# Patient Record
Sex: FEMALE | Race: WHITE | NOT HISPANIC OR LATINO | Employment: OTHER | URBAN - METROPOLITAN AREA
[De-identification: names, ages, dates, MRNs, and addresses within clinical notes are randomized per-mention and may not be internally consistent; named-entity substitution may affect disease eponyms.]

---

## 2017-02-27 ENCOUNTER — APPOINTMENT (OUTPATIENT)
Dept: PHYSICAL THERAPY | Facility: CLINIC | Age: 67
End: 2017-02-27
Payer: MEDICARE

## 2017-02-27 PROCEDURE — 97035 APP MDLTY 1+ULTRASOUND EA 15: CPT

## 2017-02-27 PROCEDURE — 97014 ELECTRIC STIMULATION THERAPY: CPT

## 2017-02-27 PROCEDURE — G8981 BODY POS CURRENT STATUS: HCPCS

## 2017-02-27 PROCEDURE — 97110 THERAPEUTIC EXERCISES: CPT

## 2017-02-27 PROCEDURE — 97140 MANUAL THERAPY 1/> REGIONS: CPT

## 2017-02-27 PROCEDURE — 97163 PT EVAL HIGH COMPLEX 45 MIN: CPT

## 2017-02-27 PROCEDURE — G8982 BODY POS GOAL STATUS: HCPCS

## 2017-03-02 ENCOUNTER — APPOINTMENT (OUTPATIENT)
Dept: PHYSICAL THERAPY | Facility: CLINIC | Age: 67
End: 2017-03-02
Payer: MEDICARE

## 2017-03-02 PROCEDURE — 97110 THERAPEUTIC EXERCISES: CPT

## 2017-03-02 PROCEDURE — 97140 MANUAL THERAPY 1/> REGIONS: CPT

## 2017-03-02 PROCEDURE — 97035 APP MDLTY 1+ULTRASOUND EA 15: CPT

## 2017-03-02 PROCEDURE — 97014 ELECTRIC STIMULATION THERAPY: CPT

## 2017-03-07 ENCOUNTER — APPOINTMENT (OUTPATIENT)
Dept: PHYSICAL THERAPY | Facility: CLINIC | Age: 67
End: 2017-03-07
Payer: MEDICARE

## 2017-03-07 PROCEDURE — 97110 THERAPEUTIC EXERCISES: CPT

## 2017-03-07 PROCEDURE — 97014 ELECTRIC STIMULATION THERAPY: CPT

## 2017-03-07 PROCEDURE — G8982 BODY POS GOAL STATUS: HCPCS

## 2017-03-07 PROCEDURE — G8981 BODY POS CURRENT STATUS: HCPCS

## 2017-03-07 PROCEDURE — 97140 MANUAL THERAPY 1/> REGIONS: CPT

## 2017-03-07 PROCEDURE — 97035 APP MDLTY 1+ULTRASOUND EA 15: CPT

## 2017-03-07 PROCEDURE — 97164 PT RE-EVAL EST PLAN CARE: CPT

## 2017-03-08 ENCOUNTER — APPOINTMENT (OUTPATIENT)
Dept: PHYSICAL THERAPY | Facility: CLINIC | Age: 67
End: 2017-03-08
Payer: MEDICARE

## 2017-03-08 PROCEDURE — 97035 APP MDLTY 1+ULTRASOUND EA 15: CPT

## 2017-03-08 PROCEDURE — 97014 ELECTRIC STIMULATION THERAPY: CPT

## 2017-03-08 PROCEDURE — 97110 THERAPEUTIC EXERCISES: CPT

## 2017-03-08 PROCEDURE — 97140 MANUAL THERAPY 1/> REGIONS: CPT

## 2017-03-13 ENCOUNTER — APPOINTMENT (OUTPATIENT)
Dept: PHYSICAL THERAPY | Facility: CLINIC | Age: 67
End: 2017-03-13
Payer: MEDICARE

## 2017-03-13 PROCEDURE — 97035 APP MDLTY 1+ULTRASOUND EA 15: CPT

## 2017-03-13 PROCEDURE — 97140 MANUAL THERAPY 1/> REGIONS: CPT

## 2017-03-13 PROCEDURE — 97110 THERAPEUTIC EXERCISES: CPT

## 2017-03-13 PROCEDURE — 97014 ELECTRIC STIMULATION THERAPY: CPT

## 2017-03-15 ENCOUNTER — APPOINTMENT (OUTPATIENT)
Dept: PHYSICAL THERAPY | Facility: CLINIC | Age: 67
End: 2017-03-15
Payer: MEDICARE

## 2017-03-22 ENCOUNTER — APPOINTMENT (OUTPATIENT)
Dept: PHYSICAL THERAPY | Facility: CLINIC | Age: 67
End: 2017-03-22
Payer: MEDICARE

## 2017-03-22 PROCEDURE — 97110 THERAPEUTIC EXERCISES: CPT

## 2017-03-22 PROCEDURE — 97035 APP MDLTY 1+ULTRASOUND EA 15: CPT

## 2017-03-22 PROCEDURE — 97014 ELECTRIC STIMULATION THERAPY: CPT

## 2017-03-22 PROCEDURE — 97140 MANUAL THERAPY 1/> REGIONS: CPT

## 2017-03-24 ENCOUNTER — APPOINTMENT (OUTPATIENT)
Dept: PHYSICAL THERAPY | Facility: CLINIC | Age: 67
End: 2017-03-24
Payer: MEDICARE

## 2017-03-24 PROCEDURE — 97110 THERAPEUTIC EXERCISES: CPT

## 2017-03-24 PROCEDURE — 97014 ELECTRIC STIMULATION THERAPY: CPT

## 2017-03-24 PROCEDURE — 97140 MANUAL THERAPY 1/> REGIONS: CPT

## 2017-03-24 PROCEDURE — 97035 APP MDLTY 1+ULTRASOUND EA 15: CPT

## 2017-03-28 ENCOUNTER — APPOINTMENT (OUTPATIENT)
Dept: PHYSICAL THERAPY | Facility: CLINIC | Age: 67
End: 2017-03-28
Payer: MEDICARE

## 2017-03-29 ENCOUNTER — APPOINTMENT (OUTPATIENT)
Dept: PHYSICAL THERAPY | Facility: CLINIC | Age: 67
End: 2017-03-29
Payer: MEDICARE

## 2017-03-29 PROCEDURE — 97140 MANUAL THERAPY 1/> REGIONS: CPT

## 2017-03-29 PROCEDURE — 97110 THERAPEUTIC EXERCISES: CPT

## 2017-03-29 PROCEDURE — 97035 APP MDLTY 1+ULTRASOUND EA 15: CPT

## 2017-03-29 PROCEDURE — 97014 ELECTRIC STIMULATION THERAPY: CPT

## 2017-03-31 ENCOUNTER — APPOINTMENT (OUTPATIENT)
Dept: PHYSICAL THERAPY | Facility: CLINIC | Age: 67
End: 2017-03-31
Payer: MEDICARE

## 2017-03-31 PROCEDURE — 97014 ELECTRIC STIMULATION THERAPY: CPT

## 2017-03-31 PROCEDURE — 97110 THERAPEUTIC EXERCISES: CPT

## 2017-03-31 PROCEDURE — 97035 APP MDLTY 1+ULTRASOUND EA 15: CPT

## 2017-03-31 PROCEDURE — 97140 MANUAL THERAPY 1/> REGIONS: CPT

## 2017-04-03 ENCOUNTER — APPOINTMENT (OUTPATIENT)
Dept: PHYSICAL THERAPY | Facility: CLINIC | Age: 67
End: 2017-04-03
Payer: MEDICARE

## 2017-04-03 PROCEDURE — 97014 ELECTRIC STIMULATION THERAPY: CPT

## 2017-04-03 PROCEDURE — 97110 THERAPEUTIC EXERCISES: CPT

## 2017-04-03 PROCEDURE — 97035 APP MDLTY 1+ULTRASOUND EA 15: CPT

## 2017-04-03 PROCEDURE — 97140 MANUAL THERAPY 1/> REGIONS: CPT

## 2017-04-05 ENCOUNTER — APPOINTMENT (OUTPATIENT)
Dept: PHYSICAL THERAPY | Facility: CLINIC | Age: 67
End: 2017-04-05
Payer: MEDICARE

## 2017-04-12 ENCOUNTER — APPOINTMENT (OUTPATIENT)
Dept: PHYSICAL THERAPY | Facility: CLINIC | Age: 67
End: 2017-04-12
Payer: MEDICARE

## 2017-04-14 ENCOUNTER — APPOINTMENT (OUTPATIENT)
Dept: PHYSICAL THERAPY | Facility: CLINIC | Age: 67
End: 2017-04-14
Payer: MEDICARE

## 2017-05-23 ENCOUNTER — APPOINTMENT (OUTPATIENT)
Dept: PHYSICAL THERAPY | Facility: CLINIC | Age: 67
End: 2017-05-23
Payer: MEDICARE

## 2017-05-23 PROCEDURE — G8982 BODY POS GOAL STATUS: HCPCS

## 2017-05-23 PROCEDURE — 97163 PT EVAL HIGH COMPLEX 45 MIN: CPT

## 2017-05-23 PROCEDURE — G8981 BODY POS CURRENT STATUS: HCPCS

## 2017-05-23 PROCEDURE — 97140 MANUAL THERAPY 1/> REGIONS: CPT

## 2017-05-23 PROCEDURE — 97110 THERAPEUTIC EXERCISES: CPT

## 2017-05-24 ENCOUNTER — APPOINTMENT (OUTPATIENT)
Dept: PHYSICAL THERAPY | Facility: CLINIC | Age: 67
End: 2017-05-24
Payer: MEDICARE

## 2017-05-24 PROCEDURE — 97110 THERAPEUTIC EXERCISES: CPT

## 2017-05-24 PROCEDURE — 97140 MANUAL THERAPY 1/> REGIONS: CPT

## 2017-05-30 ENCOUNTER — APPOINTMENT (OUTPATIENT)
Dept: PHYSICAL THERAPY | Facility: CLINIC | Age: 67
End: 2017-05-30
Payer: MEDICARE

## 2017-05-30 PROCEDURE — 97140 MANUAL THERAPY 1/> REGIONS: CPT

## 2017-05-30 PROCEDURE — 97110 THERAPEUTIC EXERCISES: CPT

## 2017-05-31 ENCOUNTER — APPOINTMENT (OUTPATIENT)
Dept: PHYSICAL THERAPY | Facility: CLINIC | Age: 67
End: 2017-05-31
Payer: MEDICARE

## 2017-05-31 PROCEDURE — 97140 MANUAL THERAPY 1/> REGIONS: CPT

## 2017-05-31 PROCEDURE — 97110 THERAPEUTIC EXERCISES: CPT

## 2017-06-02 ENCOUNTER — APPOINTMENT (OUTPATIENT)
Dept: PHYSICAL THERAPY | Facility: CLINIC | Age: 67
End: 2017-06-02
Payer: MEDICARE

## 2017-06-02 PROCEDURE — 97140 MANUAL THERAPY 1/> REGIONS: CPT

## 2017-06-02 PROCEDURE — 97110 THERAPEUTIC EXERCISES: CPT

## 2017-06-05 ENCOUNTER — APPOINTMENT (OUTPATIENT)
Dept: PHYSICAL THERAPY | Facility: CLINIC | Age: 67
End: 2017-06-05
Payer: MEDICARE

## 2017-06-05 PROCEDURE — 97110 THERAPEUTIC EXERCISES: CPT

## 2017-06-05 PROCEDURE — 97140 MANUAL THERAPY 1/> REGIONS: CPT

## 2017-06-05 PROCEDURE — G8983 BODY POS D/C STATUS: HCPCS

## 2017-06-05 PROCEDURE — G8982 BODY POS GOAL STATUS: HCPCS

## 2017-06-07 ENCOUNTER — APPOINTMENT (OUTPATIENT)
Dept: PHYSICAL THERAPY | Facility: CLINIC | Age: 67
End: 2017-06-07
Payer: MEDICARE

## 2017-06-07 PROCEDURE — 97110 THERAPEUTIC EXERCISES: CPT

## 2017-06-07 PROCEDURE — G8984 CARRY CURRENT STATUS: HCPCS

## 2017-06-07 PROCEDURE — 97140 MANUAL THERAPY 1/> REGIONS: CPT

## 2017-06-07 PROCEDURE — G8985 CARRY GOAL STATUS: HCPCS

## 2017-06-14 ENCOUNTER — APPOINTMENT (OUTPATIENT)
Dept: PHYSICAL THERAPY | Facility: CLINIC | Age: 67
End: 2017-06-14
Payer: MEDICARE

## 2017-06-14 PROCEDURE — 97140 MANUAL THERAPY 1/> REGIONS: CPT

## 2017-06-14 PROCEDURE — 97110 THERAPEUTIC EXERCISES: CPT

## 2017-06-16 ENCOUNTER — APPOINTMENT (OUTPATIENT)
Dept: PHYSICAL THERAPY | Facility: CLINIC | Age: 67
End: 2017-06-16
Payer: MEDICARE

## 2017-06-16 PROCEDURE — 97140 MANUAL THERAPY 1/> REGIONS: CPT

## 2017-06-16 PROCEDURE — 97110 THERAPEUTIC EXERCISES: CPT

## 2017-06-19 ENCOUNTER — APPOINTMENT (OUTPATIENT)
Dept: PHYSICAL THERAPY | Facility: CLINIC | Age: 67
End: 2017-06-19
Payer: MEDICARE

## 2017-06-19 PROCEDURE — 97110 THERAPEUTIC EXERCISES: CPT

## 2017-06-19 PROCEDURE — 97140 MANUAL THERAPY 1/> REGIONS: CPT

## 2017-06-21 ENCOUNTER — APPOINTMENT (OUTPATIENT)
Dept: PHYSICAL THERAPY | Facility: CLINIC | Age: 67
End: 2017-06-21
Payer: MEDICARE

## 2017-06-21 PROCEDURE — 97140 MANUAL THERAPY 1/> REGIONS: CPT

## 2017-06-21 PROCEDURE — 97110 THERAPEUTIC EXERCISES: CPT

## 2017-06-26 ENCOUNTER — APPOINTMENT (OUTPATIENT)
Dept: PHYSICAL THERAPY | Facility: CLINIC | Age: 67
End: 2017-06-26
Payer: MEDICARE

## 2017-06-26 PROCEDURE — 97110 THERAPEUTIC EXERCISES: CPT

## 2017-06-26 PROCEDURE — 97140 MANUAL THERAPY 1/> REGIONS: CPT

## 2017-06-28 ENCOUNTER — APPOINTMENT (OUTPATIENT)
Dept: PHYSICAL THERAPY | Facility: CLINIC | Age: 67
End: 2017-06-28
Payer: MEDICARE

## 2017-06-28 PROCEDURE — 97110 THERAPEUTIC EXERCISES: CPT

## 2017-06-28 PROCEDURE — G8981 BODY POS CURRENT STATUS: HCPCS

## 2017-06-28 PROCEDURE — 97140 MANUAL THERAPY 1/> REGIONS: CPT

## 2017-06-28 PROCEDURE — G8982 BODY POS GOAL STATUS: HCPCS

## 2017-06-30 ENCOUNTER — APPOINTMENT (OUTPATIENT)
Dept: PHYSICAL THERAPY | Facility: CLINIC | Age: 67
End: 2017-06-30
Payer: MEDICARE

## 2017-06-30 PROCEDURE — 97140 MANUAL THERAPY 1/> REGIONS: CPT

## 2017-06-30 PROCEDURE — 97110 THERAPEUTIC EXERCISES: CPT

## 2017-06-30 PROCEDURE — G8981 BODY POS CURRENT STATUS: HCPCS

## 2017-06-30 PROCEDURE — G8982 BODY POS GOAL STATUS: HCPCS

## 2017-07-03 ENCOUNTER — APPOINTMENT (OUTPATIENT)
Dept: PHYSICAL THERAPY | Facility: CLINIC | Age: 67
End: 2017-07-03
Payer: MEDICARE

## 2017-07-05 ENCOUNTER — APPOINTMENT (OUTPATIENT)
Dept: PHYSICAL THERAPY | Facility: CLINIC | Age: 67
End: 2017-07-05
Payer: MEDICARE

## 2017-07-05 PROCEDURE — 97110 THERAPEUTIC EXERCISES: CPT

## 2017-07-05 PROCEDURE — 97140 MANUAL THERAPY 1/> REGIONS: CPT

## 2017-07-06 ENCOUNTER — APPOINTMENT (OUTPATIENT)
Dept: PHYSICAL THERAPY | Facility: CLINIC | Age: 67
End: 2017-07-06
Payer: MEDICARE

## 2017-07-06 PROCEDURE — 97140 MANUAL THERAPY 1/> REGIONS: CPT

## 2017-07-06 PROCEDURE — 97110 THERAPEUTIC EXERCISES: CPT

## 2017-07-07 ENCOUNTER — APPOINTMENT (OUTPATIENT)
Dept: PHYSICAL THERAPY | Facility: CLINIC | Age: 67
End: 2017-07-07
Payer: MEDICARE

## 2017-07-07 PROCEDURE — 97140 MANUAL THERAPY 1/> REGIONS: CPT

## 2017-07-07 PROCEDURE — 97110 THERAPEUTIC EXERCISES: CPT

## 2017-07-10 ENCOUNTER — APPOINTMENT (OUTPATIENT)
Dept: PHYSICAL THERAPY | Facility: CLINIC | Age: 67
End: 2017-07-10
Payer: MEDICARE

## 2017-07-10 PROCEDURE — 97110 THERAPEUTIC EXERCISES: CPT

## 2017-07-10 PROCEDURE — 97140 MANUAL THERAPY 1/> REGIONS: CPT

## 2017-07-12 ENCOUNTER — APPOINTMENT (OUTPATIENT)
Dept: PHYSICAL THERAPY | Facility: CLINIC | Age: 67
End: 2017-07-12
Payer: MEDICARE

## 2017-07-12 PROCEDURE — 97140 MANUAL THERAPY 1/> REGIONS: CPT

## 2017-07-12 PROCEDURE — 97110 THERAPEUTIC EXERCISES: CPT

## 2017-07-13 ENCOUNTER — APPOINTMENT (OUTPATIENT)
Dept: PHYSICAL THERAPY | Facility: CLINIC | Age: 67
End: 2017-07-13
Payer: MEDICARE

## 2017-07-13 PROCEDURE — 97110 THERAPEUTIC EXERCISES: CPT

## 2017-07-13 PROCEDURE — G8981 BODY POS CURRENT STATUS: HCPCS

## 2017-07-13 PROCEDURE — G8982 BODY POS GOAL STATUS: HCPCS

## 2017-07-13 PROCEDURE — 97140 MANUAL THERAPY 1/> REGIONS: CPT

## 2017-07-14 ENCOUNTER — APPOINTMENT (OUTPATIENT)
Dept: PHYSICAL THERAPY | Facility: CLINIC | Age: 67
End: 2017-07-14
Payer: MEDICARE

## 2017-07-14 PROCEDURE — G8981 BODY POS CURRENT STATUS: HCPCS

## 2017-07-14 PROCEDURE — 97110 THERAPEUTIC EXERCISES: CPT

## 2017-07-14 PROCEDURE — G8982 BODY POS GOAL STATUS: HCPCS

## 2017-07-14 PROCEDURE — 97140 MANUAL THERAPY 1/> REGIONS: CPT

## 2017-07-17 ENCOUNTER — APPOINTMENT (OUTPATIENT)
Dept: PHYSICAL THERAPY | Facility: CLINIC | Age: 67
End: 2017-07-17
Payer: MEDICARE

## 2017-07-17 PROCEDURE — 97110 THERAPEUTIC EXERCISES: CPT

## 2017-07-17 PROCEDURE — 97140 MANUAL THERAPY 1/> REGIONS: CPT

## 2017-07-19 ENCOUNTER — APPOINTMENT (OUTPATIENT)
Dept: PHYSICAL THERAPY | Facility: CLINIC | Age: 67
End: 2017-07-19
Payer: MEDICARE

## 2017-07-19 PROCEDURE — 97110 THERAPEUTIC EXERCISES: CPT

## 2017-07-19 PROCEDURE — 97140 MANUAL THERAPY 1/> REGIONS: CPT

## 2017-07-21 ENCOUNTER — APPOINTMENT (OUTPATIENT)
Dept: PHYSICAL THERAPY | Facility: CLINIC | Age: 67
End: 2017-07-21
Payer: MEDICARE

## 2017-07-21 PROCEDURE — 97140 MANUAL THERAPY 1/> REGIONS: CPT

## 2017-07-21 PROCEDURE — 97110 THERAPEUTIC EXERCISES: CPT

## 2017-07-25 ENCOUNTER — APPOINTMENT (OUTPATIENT)
Dept: PHYSICAL THERAPY | Facility: CLINIC | Age: 67
End: 2017-07-25
Payer: MEDICARE

## 2017-07-26 ENCOUNTER — APPOINTMENT (OUTPATIENT)
Dept: PHYSICAL THERAPY | Facility: CLINIC | Age: 67
End: 2017-07-26
Payer: MEDICARE

## 2017-07-26 PROCEDURE — 97110 THERAPEUTIC EXERCISES: CPT

## 2017-07-26 PROCEDURE — 97140 MANUAL THERAPY 1/> REGIONS: CPT

## 2017-07-27 ENCOUNTER — APPOINTMENT (OUTPATIENT)
Dept: PHYSICAL THERAPY | Facility: CLINIC | Age: 67
End: 2017-07-27
Payer: MEDICARE

## 2017-07-27 PROCEDURE — 97110 THERAPEUTIC EXERCISES: CPT

## 2017-07-27 PROCEDURE — 97140 MANUAL THERAPY 1/> REGIONS: CPT

## 2017-07-28 ENCOUNTER — APPOINTMENT (OUTPATIENT)
Dept: PHYSICAL THERAPY | Facility: CLINIC | Age: 67
End: 2017-07-28
Payer: MEDICARE

## 2017-07-28 PROCEDURE — 97140 MANUAL THERAPY 1/> REGIONS: CPT

## 2017-07-28 PROCEDURE — 97110 THERAPEUTIC EXERCISES: CPT

## 2017-07-31 ENCOUNTER — APPOINTMENT (OUTPATIENT)
Dept: PHYSICAL THERAPY | Facility: CLINIC | Age: 67
End: 2017-07-31
Payer: MEDICARE

## 2017-07-31 PROCEDURE — 97140 MANUAL THERAPY 1/> REGIONS: CPT

## 2017-07-31 PROCEDURE — G8981 BODY POS CURRENT STATUS: HCPCS

## 2017-07-31 PROCEDURE — G8982 BODY POS GOAL STATUS: HCPCS

## 2017-07-31 PROCEDURE — 97110 THERAPEUTIC EXERCISES: CPT

## 2017-08-02 ENCOUNTER — APPOINTMENT (OUTPATIENT)
Dept: PHYSICAL THERAPY | Facility: CLINIC | Age: 67
End: 2017-08-02
Payer: MEDICARE

## 2017-08-02 PROCEDURE — G8982 BODY POS GOAL STATUS: HCPCS

## 2017-08-02 PROCEDURE — G8981 BODY POS CURRENT STATUS: HCPCS

## 2017-08-02 PROCEDURE — 97110 THERAPEUTIC EXERCISES: CPT

## 2017-08-02 PROCEDURE — 97140 MANUAL THERAPY 1/> REGIONS: CPT

## 2017-08-04 ENCOUNTER — APPOINTMENT (OUTPATIENT)
Dept: PHYSICAL THERAPY | Facility: CLINIC | Age: 67
End: 2017-08-04
Payer: MEDICARE

## 2017-08-04 PROCEDURE — 97140 MANUAL THERAPY 1/> REGIONS: CPT

## 2017-08-04 PROCEDURE — 97110 THERAPEUTIC EXERCISES: CPT

## 2017-08-07 ENCOUNTER — APPOINTMENT (OUTPATIENT)
Dept: PHYSICAL THERAPY | Facility: CLINIC | Age: 67
End: 2017-08-07
Payer: MEDICARE

## 2017-08-07 PROCEDURE — 97140 MANUAL THERAPY 1/> REGIONS: CPT

## 2017-08-07 PROCEDURE — 97110 THERAPEUTIC EXERCISES: CPT

## 2017-08-09 ENCOUNTER — APPOINTMENT (OUTPATIENT)
Dept: PHYSICAL THERAPY | Facility: CLINIC | Age: 67
End: 2017-08-09
Payer: MEDICARE

## 2017-08-09 PROCEDURE — 97110 THERAPEUTIC EXERCISES: CPT

## 2017-08-09 PROCEDURE — 97140 MANUAL THERAPY 1/> REGIONS: CPT

## 2017-08-11 ENCOUNTER — APPOINTMENT (OUTPATIENT)
Dept: PHYSICAL THERAPY | Facility: CLINIC | Age: 67
End: 2017-08-11
Payer: MEDICARE

## 2017-08-11 PROCEDURE — 97140 MANUAL THERAPY 1/> REGIONS: CPT

## 2017-08-11 PROCEDURE — 97110 THERAPEUTIC EXERCISES: CPT

## 2017-08-15 ENCOUNTER — APPOINTMENT (OUTPATIENT)
Dept: PHYSICAL THERAPY | Facility: CLINIC | Age: 67
End: 2017-08-15
Payer: MEDICARE

## 2017-08-15 PROCEDURE — 97110 THERAPEUTIC EXERCISES: CPT

## 2017-08-15 PROCEDURE — 97140 MANUAL THERAPY 1/> REGIONS: CPT

## 2017-08-17 ENCOUNTER — APPOINTMENT (OUTPATIENT)
Dept: PHYSICAL THERAPY | Facility: CLINIC | Age: 67
End: 2017-08-17
Payer: MEDICARE

## 2017-08-17 PROCEDURE — 97110 THERAPEUTIC EXERCISES: CPT

## 2017-08-17 PROCEDURE — 97140 MANUAL THERAPY 1/> REGIONS: CPT

## 2018-04-11 ENCOUNTER — EVALUATION (OUTPATIENT)
Dept: PHYSICAL THERAPY | Facility: CLINIC | Age: 68
End: 2018-04-11
Payer: MEDICARE

## 2018-04-11 ENCOUNTER — TRANSCRIBE ORDERS (OUTPATIENT)
Dept: PHYSICAL THERAPY | Facility: CLINIC | Age: 68
End: 2018-04-11

## 2018-04-11 DIAGNOSIS — M25.551 RIGHT HIP PAIN: Primary | ICD-10-CM

## 2018-04-11 PROCEDURE — G8979 MOBILITY GOAL STATUS: HCPCS | Performed by: PHYSICAL THERAPIST

## 2018-04-11 PROCEDURE — 97014 ELECTRIC STIMULATION THERAPY: CPT | Performed by: PHYSICAL THERAPIST

## 2018-04-11 PROCEDURE — 97140 MANUAL THERAPY 1/> REGIONS: CPT | Performed by: PHYSICAL THERAPIST

## 2018-04-11 PROCEDURE — G8978 MOBILITY CURRENT STATUS: HCPCS | Performed by: PHYSICAL THERAPIST

## 2018-04-11 PROCEDURE — 97163 PT EVAL HIGH COMPLEX 45 MIN: CPT | Performed by: PHYSICAL THERAPIST

## 2018-04-11 PROCEDURE — 97110 THERAPEUTIC EXERCISES: CPT | Performed by: PHYSICAL THERAPIST

## 2018-04-11 NOTE — PROGRESS NOTES
PT Evaluation     Today's date: 2018  Patient name: Adrien Calderon  : 1950  MRN: [id number]  Referring provider: Tara Power MD  Dx:   Encounter Diagnosis     ICD-10-CM    1  Right hip pain M25 551        Start Time: 09  Stop Time: 1100  Total time in clinic (min): 75 minutes    Assessment    Assessment details: Adrien Calderon 79 y o  female presents recent onset of right hip and / or SI joint pain  Patient notes limitations in sitting tolerance, and complains of posterior hip pain with some referred pain to right groin and anterior thigh  The patient has a history of bilateral total hip replacements  Both appear stable  Patient also has experienced lumbar pain  with spasm  Hip Pain rated   6/10; at worse with sitting  Palpable tenderness at right SI joint and and along right IT band  R Hip AROM: F= 98    ABD= 35   EXT= 8  ER= 30  IR=  neutral  Hip Strength: F  4  Abd  -4  E +4  R Knee strength  F +4   E 4  Patient is ambulating without device  Gait pattern is compromised with initial standing after sitting, but improves with further ambulation  Barriers to therapy: general OA; multiple joint replacements (both hips and right shoulder); DJD both knees; obesity lumbar DDD and stenosis    Goals  STG  Reduce right hip/SI joint pain by 50% 2-3 weeks  Increase strength right hip 1/2 grade 3-4 weeks  LTG  Improve sitting tolerance  Establish effective HEP  Resume normal functional activities    Plan  Plan details: 2 X per week X 4 weeks  Modalities, prn  MT LE stretching; STM  TE - stretching LE strengthening           Subjective Patient complains of recent right hip pain, and expressed concern that her total joint is loose      Objective   R Hip AROM: F= 98    ABD= 35   EXT= 8  ER= 30  IR=  neutral  Hip Strength: F  4  Abd  -4  E +4  R Knee strength  F +4   E 4      Flowsheet Rows    Flowsheet Row Most Recent Value   PT/OT G-Codes   Assessment Type  Evaluation   G code set  Mobility: Walking & Moving Around   Mobility: Walking and Moving Around Current Status ()  CJ   Mobility: Walking and Moving Around Goal Status ()  [address]        Manual  4/11            LE stretching 8            STM 7                             Exercise Diary  4/11            Pelvic tilt 20            Supine rotation 2/20            T-band Abd 3/10  LV3                                      Bike                              Modalities  4/11            IFC / HP 15

## 2018-04-13 ENCOUNTER — OFFICE VISIT (OUTPATIENT)
Dept: PHYSICAL THERAPY | Facility: CLINIC | Age: 68
End: 2018-04-13
Payer: MEDICARE

## 2018-04-13 DIAGNOSIS — M25.551 RIGHT HIP PAIN: Primary | ICD-10-CM

## 2018-04-13 PROCEDURE — 97014 ELECTRIC STIMULATION THERAPY: CPT | Performed by: PHYSICAL THERAPIST

## 2018-04-13 PROCEDURE — 97140 MANUAL THERAPY 1/> REGIONS: CPT | Performed by: PHYSICAL THERAPIST

## 2018-04-13 PROCEDURE — 97110 THERAPEUTIC EXERCISES: CPT | Performed by: PHYSICAL THERAPIST

## 2018-04-13 NOTE — PROGRESS NOTES
Daily Note     Today's date: 2018  Patient name: Lindsay Romero  : 1950  MRN: [id number]  Referring provider: Cindi Barrera MD  Dx:   Encounter Diagnosis     ICD-10-CM    1  Right hip pain M25 551        Start Time: 940  Stop Time: 0  Total time in clinic (min): 60 minutes    Subjective: Patient states her lower back is improving, but still has localized right SI joint and IT band pain  Objective: PT performed LE stretching, including modified IT band stretch due to [facility]    Manual                     LE stretching 8  8                   STM 7  8                                                 Exercise Diary                     Pelvic tilt 20  30                   Supine rotation 2/20  3/20                   T-band Abd 3/10  LV3  3/10 LV3                                                                   Bike                                                     Modalities                     IFC / HP 15  18                                                                             Assessment: Tolerated treatment well  PT reviewed HEP    Plan: Continue per plan of care

## 2018-04-16 ENCOUNTER — OFFICE VISIT (OUTPATIENT)
Dept: PHYSICAL THERAPY | Facility: CLINIC | Age: 68
End: 2018-04-16
Payer: MEDICARE

## 2018-04-16 DIAGNOSIS — M25.551 RIGHT HIP PAIN: Primary | ICD-10-CM

## 2018-04-16 PROCEDURE — 97110 THERAPEUTIC EXERCISES: CPT | Performed by: PHYSICAL THERAPIST

## 2018-04-16 PROCEDURE — 97140 MANUAL THERAPY 1/> REGIONS: CPT | Performed by: PHYSICAL THERAPIST

## 2018-04-16 PROCEDURE — 97014 ELECTRIC STIMULATION THERAPY: CPT | Performed by: PHYSICAL THERAPIST

## 2018-04-16 NOTE — PROGRESS NOTES
Daily Note     Today's date: 2018  Patient name: Sukumar Saunders  : 1950  MRN: [id number]  Referring provider: Shena Giang MD  Dx:   Encounter Diagnosis     ICD-10-CM    1  Right hip pain M25 551        Start Time: 1400  Stop Time: 1504  Total time in clinic (min): 64 minutes    Subjective: Patient notes improvement  She was able to lie on her right side   without right hip or right shoulder pain  Objective: PT LE stretching and STM post modalities  Manual                   LE stretching 8  8  8                 STM 7  8  8                                               Exercise Diary                   Pelvic tilt 20  30  30                 Supine rotation 2/20  3/20  6# 3/20                 T-band Abd 3/10  LV3  3/10 LV3  3/10 LV3                                                                 Bike      20'                                               Modalities                   IFC / HP 15  18  18                                                             Assessment: Tolerated treatment well  Patient resume recumbent bike without any discomfort  Plan: Continue per plan of care

## 2018-04-17 ENCOUNTER — OFFICE VISIT (OUTPATIENT)
Dept: PHYSICAL THERAPY | Facility: CLINIC | Age: 68
End: 2018-04-17
Payer: MEDICARE

## 2018-04-17 DIAGNOSIS — M25.551 RIGHT HIP PAIN: Primary | ICD-10-CM

## 2018-04-17 PROCEDURE — 97140 MANUAL THERAPY 1/> REGIONS: CPT | Performed by: PHYSICAL THERAPIST

## 2018-04-17 PROCEDURE — 97014 ELECTRIC STIMULATION THERAPY: CPT | Performed by: PHYSICAL THERAPIST

## 2018-04-17 PROCEDURE — 97110 THERAPEUTIC EXERCISES: CPT | Performed by: PHYSICAL THERAPIST

## 2018-04-17 NOTE — PROGRESS NOTES
Daily Note     Today's date: 2018  Patient name: Ilana Mansfield  : 1950  MRN: [id number]  Referring provider: Adair Perkins MD  Dx:   Encounter Diagnosis     ICD-10-CM    1  Right hip pain M25 551        Start Time: 855  Stop Time: 959  Total time in clinic (min): 64 minutes    Subjective: Patient notes further improvement in right L/S and SI joint  She still has sensitivity in the right IT band  Objective: PT included modalities to IT band  Manual                 LE stretching 8  8  8  8               STM 7  8  8  8                                             Exercise Diary                 Pelvic tilt 20  30  30  40               Supine rotation 2/20  3/20  6# 3  6# 3/20               T-band Abd 3/10  LV3  3/10 LV3  3/10 LV3  3/10 LV3                                                               Bike      20'  20'                                             Modalities                 IFC / HP 15  18  18  18                                                               Assessment: Tolerated treatment well  Patient stated that IT band was less tender post treatment      Plan: Continue per plan of care

## 2018-04-20 ENCOUNTER — OFFICE VISIT (OUTPATIENT)
Dept: PHYSICAL THERAPY | Facility: CLINIC | Age: 68
End: 2018-04-20
Payer: MEDICARE

## 2018-04-20 DIAGNOSIS — M25.551 RIGHT HIP PAIN: Primary | ICD-10-CM

## 2018-04-20 PROCEDURE — 97014 ELECTRIC STIMULATION THERAPY: CPT | Performed by: PHYSICAL THERAPIST

## 2018-04-20 PROCEDURE — 97035 APP MDLTY 1+ULTRASOUND EA 15: CPT | Performed by: PHYSICAL THERAPIST

## 2018-04-20 PROCEDURE — 97140 MANUAL THERAPY 1/> REGIONS: CPT | Performed by: PHYSICAL THERAPIST

## 2018-04-20 NOTE — PROGRESS NOTES
Daily Note     Today's date: 2018  Patient name: Denise Gaming  : 1950  MRN: [id number]  Referring provider: Davey Mcghee MD  Dx:   Encounter Diagnosis     ICD-10-CM    1  Right hip pain M25 551        Start Time: 930  Stop Time: 1016  Total time in clinic (min): 46 minutes    Subjective: Patient experienced a mild flare-up of localized lumbar pain yesterday  Objective: PT had the patient hold on exercises today, except for the recumbent bike which did not cause any lumbar pain  Provided modalities and STM  Manual               LE stretching 8  8  8  8  8             STM 7  8  8  8  8                                           Exercise Diary               Pelvic tilt 20  30  30  40  ------>             Supine rotation 2/20  3/20  6# 3/20  6# 3/20  ------>             T-band Abd 3/10  LV3  3/10 LV3  3/10 LV3  3/10 LV3  ------>                                                             Bike      20'  20'  20'                                           Modalities               IFC / HP 15  18  18  18  18              US ([email or web address]  8)          10                                              Assessment:  Patient had less lower back pain post treatment  Plan: Resume exercises next visit

## 2018-04-23 ENCOUNTER — OFFICE VISIT (OUTPATIENT)
Dept: PHYSICAL THERAPY | Facility: CLINIC | Age: 68
End: 2018-04-23
Payer: MEDICARE

## 2018-04-23 DIAGNOSIS — M25.551 RIGHT HIP PAIN: Primary | ICD-10-CM

## 2018-04-23 PROCEDURE — 97014 ELECTRIC STIMULATION THERAPY: CPT

## 2018-04-23 PROCEDURE — 97140 MANUAL THERAPY 1/> REGIONS: CPT

## 2018-04-23 PROCEDURE — 97035 APP MDLTY 1+ULTRASOUND EA 15: CPT

## 2018-04-23 PROCEDURE — 97110 THERAPEUTIC EXERCISES: CPT

## 2018-04-23 NOTE — PROGRESS NOTES
Daily Note     Today's date: 2018  Patient name: Tena Barker  : 1950  MRN: [id number]  Referring provider: Hadley Moon MD  Dx:   Encounter Diagnosis     ICD-10-CM    1  Right hip pain M25 551        Start Time: 930  Stop Time:   Total time in clinic (min): 75 minutes    Subjective: Pt reports overall less R lumbar/hip pain  She encinas note occasional  muscle spasm , same area  Objective: See treatment diary below  Manual              LE stretching 8  8  8  8  8  10           STM 7  8  8  8  8  5                                         Exercise Diary              Pelvic tilt 20  30  30  40  ------> 30            Supine rotation 2/20  3/20  6# 3/20  6# 3/20  ------>  6#  3/20           T-band Abd 3/10  LV3  3/10 LV3  3/10 LV3  3/10 LV3  ------>  L 3  3/10                                                           Bike      20'  20'  20'  20'                                         Modalities              IFC / HP 15  18  18  18  18  18            US ([email or web address]  8)          10  10                                                Assessment: Tolerated treatment well  Patient exhibited good technique with therapeutic exercises      Plan: Continue per plan of care

## 2018-04-30 ENCOUNTER — OFFICE VISIT (OUTPATIENT)
Dept: PHYSICAL THERAPY | Facility: CLINIC | Age: 68
End: 2018-04-30
Payer: MEDICARE

## 2018-04-30 DIAGNOSIS — M25.551 RIGHT HIP PAIN: Primary | ICD-10-CM

## 2018-04-30 PROCEDURE — 97014 ELECTRIC STIMULATION THERAPY: CPT

## 2018-04-30 PROCEDURE — 97110 THERAPEUTIC EXERCISES: CPT

## 2018-04-30 PROCEDURE — 97035 APP MDLTY 1+ULTRASOUND EA 15: CPT

## 2018-04-30 PROCEDURE — 97140 MANUAL THERAPY 1/> REGIONS: CPT

## 2018-04-30 NOTE — PROGRESS NOTES
Daily Note     Today's date: 2018  Patient name: Adrien Calderon  : 1950  MRN: [id number]  Referring provider: Tara Power MD  Dx:   Encounter Diagnosis     ICD-10-CM    1  Right hip pain M25 551        Start Time: 930  Stop Time:   Total time in clinic (min): 75 minutes    Subjective: Pt reports increased flexibility after tx last visit  Objective: See treatment diary below  Manual           LE stretching 8  8  8  8  8  10  10         STM 7  8  8  8  8  5 ---                                       Exercise Diary            Pelvic tilt 20  30  30  40  ------> 30   30         Supine rotation 2/20  3/20  6# 3/20  6# 3/20  ------>  6#  3/20  6#  3/20         T-band Abd 3/10  LV3  3/10 LV3  3/10 LV3  3/10 LV3  ------>  L 3  3/10  L 3  3/10                                                         Bike      20'  20'  20'  20'  20'                                       Modalities            IFC / HP 15  18  18  18  18  18  18          US ([email or web address]  8)          10  10                                             Assessment: Tolerated treatment well  Patient exhibited good technique with therapeutic exercises      Plan: Continue per plan of care

## 2018-05-02 ENCOUNTER — TRANSCRIBE ORDERS (OUTPATIENT)
Dept: PHYSICAL THERAPY | Facility: CLINIC | Age: 68
End: 2018-05-02

## 2018-05-02 ENCOUNTER — OFFICE VISIT (OUTPATIENT)
Dept: PHYSICAL THERAPY | Facility: CLINIC | Age: 68
End: 2018-05-02
Payer: MEDICARE

## 2018-05-02 DIAGNOSIS — M25.551 RIGHT HIP PAIN: Primary | ICD-10-CM

## 2018-05-02 PROCEDURE — 97110 THERAPEUTIC EXERCISES: CPT | Performed by: PHYSICAL THERAPIST

## 2018-05-02 PROCEDURE — G8979 MOBILITY GOAL STATUS: HCPCS | Performed by: PHYSICAL THERAPIST

## 2018-05-02 PROCEDURE — 97035 APP MDLTY 1+ULTRASOUND EA 15: CPT | Performed by: PHYSICAL THERAPIST

## 2018-05-02 PROCEDURE — G8978 MOBILITY CURRENT STATUS: HCPCS | Performed by: PHYSICAL THERAPIST

## 2018-05-02 PROCEDURE — 97140 MANUAL THERAPY 1/> REGIONS: CPT | Performed by: PHYSICAL THERAPIST

## 2018-05-02 NOTE — LETTER
May 2, 2018    Nils Franco MD  [address] [place] [address]    Patient: Brian Cano   YOB: 1950   Date of Visit: 2018     Encounter Diagnosis     ICD-10-CM    1  Right hip pain M25 551        Dear Dr Gill Rodriguez:    Please review the attached Plan of Care from [facility] recent visit  Please verify that you agree therapy should continue by signing the attached document and sending it back to our office  If you have any questions or concerns, please don't hesitate to call  Sincerely,    Jacquie Chavez, PT      Referring Provider:      I certify that I have read the below Plan of Care and certify the need for these services furnished under this plan of treatment while under my care  Nils Franco MD  [address] [place] [address]: [phone]          PT Re-Evaluation     Today's date: 2018  Patient name: Brian Cano  : 1950  MRN: [id number]  Referring provider: Suzann Angelucci, MD  Dx:   Encounter Diagnosis     ICD-10-CM    1  Right hip pain M25 551        Start Time: 0900  Stop Time: 1006  Total time in clinic (min): 66 minutes    Assessment    Assessment details: Brian Cano 79 y o  female with a recent onset of right hip and / or SI joint pain has been seen 8 times in outpatient physical therapy  Patient no longer reports significant limitations in sitting, but does note increased right buttocks pain with extended standing  PT notes palpable tenderness at L5-S1 bilaterally, R>L  Assessment of right hip (THR) is unremarkable  Impression: the patient is presenting symptoms related to lumbar stenosis  Hip Pain right L5-S1 rated  4/10; at worse with extended standing   Less pain when walking  Less alpable tenderness at right SI joint and and along right IT band  R Hip AROM: F= 100    ABD= 35   EXT= 10  ER= 45  IR=  neutral  Hip Strength: F  4+  Abd  -4  E 5  R Knee strength  F +4   E +4  Patient is ambulating without device  Gait pattern is improved but standing tolerance remains limited 5-8 mincompromised Goals    STG  Reduce right hip/SI joint pain by 50% 2-3 weeks- Achieved  Increase strength right hip 1/2 grade 3-4 weeks- AChieved  LTG  Improve sitting tolerance-Achieved  Reduce L5-S1 pain  Resume normal functional activities     Plan details: 2 X per week X 4 weeks  Modalities, prn  MT LE stretching; STM  TE - stretching LE strengthening             Subjective Patient complained of increased right posterior hip pain with extended standing    Objective   Hip Pain right L5-S1 rated  4/10; at worse with extended standing  Less pain when walking  Less alpable tenderness at right SI joint and and along right IT band  R Hip AROM: F= 100    ABD= 35   EXT= 10  ER= 45  IR=  neutral        Manual  5/2 4/13 4/16 4/17 4/20 4/23 4/30         LE stretching 8  8  8  8  8  10  10         STM 7  8  8  8  8  5 ---                                       Exercise Diary  5/2 4/13 4/16 4/17 4/20 4/23 4/30          Pelvic tilt 20  30  30  40  ------> 30   30         Supine rotation 2/20  3/20  6# 3/20  6# 3/20  ------>  6#  3/20  6#  3/20         T-band Abd 3/10  LV3  3/10 LV3  3/10 LV3  3/10 LV3  ------>  L 3  3/10  L 3  3/10                                                         Bike  20    20'  20'  20'  20'  20'                                       Modalities  5/2 4/13 4/16 4/17 4/20 4/23 4/30          IFC / HP 15  18  18  18  18  18  18          US ([email or web address]  8)  10        10  10

## 2018-05-02 NOTE — PROGRESS NOTES
PT Re-Evaluation     Today's date: 2018  Patient name: Janna Patino  : 1950  MRN: [id number]  Referring provider: Dajuan Ng MD  Dx:   Encounter Diagnosis     ICD-10-CM    1  Right hip pain M25 551        Start Time: 0900  Stop Time: 1006  Total time in clinic (min): 66 minutes    Assessment    Assessment details: Janna Patino 79 y o  female with a recent onset of right hip and / or SI joint pain has been seen 8 times in outpatient physical therapy  Patient no longer reports significant limitations in sitting, but does note increased right buttocks pain with extended standing  PT notes palpable tenderness at L5-S1 bilaterally, R>L  Assessment of right hip (THR) is unremarkable  Impression: the patient is presenting symptoms related to lumbar stenosis  Hip Pain right L5-S1 rated  4/10; at worse with extended standing  Less pain when walking  Less alpable tenderness at right SI joint and and along right IT band  R Hip AROM: F= 100    ABD= 35   EXT= 10  ER= 45  IR=  neutral  Hip Strength: F  4+  Abd  -4  E 5  R Knee strength  F +4   E +4  Patient is ambulating without device  Gait pattern is improved but standing tolerance remains limited 5-8 mincompromised Goals    STG  Reduce right hip/SI joint pain by 50% 2-3 weeks- Achieved  Increase strength right hip 1/2 grade 3-4 weeks- AChieved  LTG  Improve sitting tolerance-Achieved  Reduce L5-S1 pain  Resume normal functional activities     Plan details: 2 X per week X 4 weeks  Modalities, prn  MT LE stretching; STM  TE - stretching LE strengthening             Subjective Patient complained of increased right posterior hip pain with extended standing    Objective   Hip Pain right L5-S1 rated  4/10; at worse with extended standing   Less pain when walking  Less alpable tenderness at right SI joint and and along right IT band  R Hip AROM: F= 100    ABD= 35   EXT= 10  ER= 45  IR=  neutral        Manual         LE stretching 8  8  8  8  8  10  10         STM 7  8  8  8  8  5 ---                                       Exercise Diary  5/2 4/13 4/16 4/17 4/20 4/23 4/30          Pelvic tilt 20  30  30  40  ------> 30   30         Supine rotation 2/20  3/20  6# 3/20  6# 3/20  ------>  6#  3/20  6#  3/20         T-band Abd 3/10  LV3  3/10 LV3  3/10 LV3  3/10 LV3  ------>  L 3  3/10  L 3  3/10                                                         Bike  20    20'  20'  20'  20'  20'                                       Modalities  5/2 4/13 4/16 4/17 4/20 4/23 4/30          IFC / HP 15  18  18  18  18  18  18          US ([email or web address]  8)  10        10  10

## 2018-05-04 ENCOUNTER — APPOINTMENT (OUTPATIENT)
Dept: PHYSICAL THERAPY | Facility: CLINIC | Age: 68
End: 2018-05-04
Payer: MEDICARE

## 2018-05-08 ENCOUNTER — OFFICE VISIT (OUTPATIENT)
Dept: PHYSICAL THERAPY | Facility: CLINIC | Age: 68
End: 2018-05-08
Payer: MEDICARE

## 2018-05-08 DIAGNOSIS — M25.551 RIGHT HIP PAIN: Primary | ICD-10-CM

## 2018-05-08 PROCEDURE — 97140 MANUAL THERAPY 1/> REGIONS: CPT | Performed by: PHYSICAL THERAPIST

## 2018-05-08 PROCEDURE — 97110 THERAPEUTIC EXERCISES: CPT | Performed by: PHYSICAL THERAPIST

## 2018-05-08 PROCEDURE — 97014 ELECTRIC STIMULATION THERAPY: CPT | Performed by: PHYSICAL THERAPIST

## 2018-05-08 NOTE — PROGRESS NOTES
Daily Note     Today's date: 2018  Patient name: Ilana Mansfield  : 1950  MRN: [id number]  Referring provider: Adair Perkins MD  Dx:   Encounter Diagnosis     ICD-10-CM    1  Right hip pain M25 551        Start Time: 915  Stop Time:   Total time in clinic (min): 63 minutes    Subjective: Patient still complains of right posterior hip pain, and is frustrated with her inability to stand for more than 10 min  Patient also notes difficulty getting in and out of car, and her sitting tolerance is limited due to pain at the right ischial tuberosity  Objective: PT performed LE stretching  Provided modalities to right hip and lumbar region  PT recommended that she contact [facility] to have her right hip assessed  Her right THR is about 13years old  Assessment: Tolerated treatment fair  Patient still complained of right posterior hip pain  Plan: Continue modalities, prn and LE stretching    Manual           LE stretching 8  10  8  8  8  10  10         STM 7 5  8  8  8  5 ---                                       Exercise Diary            Pelvic tilt 20  30  30  40  ------> 30   30         Supine rotation 2/20  3/20  6# 3/20  6# 3/20  ------>  6#  3/20  6#  3/20         T-band Abd 3/10  LV3  3/10 LV3  3/10 LV3  3/10 LV3  ------>  L 3  3/10  L 3  3/10                                                         Bike  20  12  20'  20'  20'  20'  20'                                       Modalities            IFC / HP 15  18  18  18  18  18  18          US ([email or web address]  8)  10        10  10

## 2018-05-09 ENCOUNTER — APPOINTMENT (OUTPATIENT)
Dept: PHYSICAL THERAPY | Facility: CLINIC | Age: 68
End: 2018-05-09
Payer: MEDICARE

## 2018-05-14 ENCOUNTER — OFFICE VISIT (OUTPATIENT)
Dept: PHYSICAL THERAPY | Facility: CLINIC | Age: 68
End: 2018-05-14
Payer: MEDICARE

## 2018-05-14 DIAGNOSIS — M25.551 RIGHT HIP PAIN: Primary | ICD-10-CM

## 2018-05-14 PROCEDURE — 97140 MANUAL THERAPY 1/> REGIONS: CPT | Performed by: PHYSICAL THERAPIST

## 2018-05-14 PROCEDURE — 97110 THERAPEUTIC EXERCISES: CPT | Performed by: PHYSICAL THERAPIST

## 2018-05-14 PROCEDURE — 97014 ELECTRIC STIMULATION THERAPY: CPT | Performed by: PHYSICAL THERAPIST

## 2018-05-14 PROCEDURE — 97035 APP MDLTY 1+ULTRASOUND EA 15: CPT | Performed by: PHYSICAL THERAPIST

## 2018-05-14 NOTE — PROGRESS NOTES
Daily Note     Today's date: 2018  Patient name: Joe Dockery  : 1950  MRN: [id number]  Referring provider: Nan Burgos MD  Dx:   Encounter Diagnosis     ICD-10-CM    1  Right hip pain M25 551        Start Time: 1000  Stop Time: 1116  Total time in clinic (min): 76 minutes    Subjective: Patient is scheduled to see Dr Shaquille Mendoza this week  She still has right L/S pain and tenderness into right buttocks  She did note that she is able to raise her leg more easily when getting in and out of her car  Objective: PT directed exercise program, and provided modalities and STM  Manual           LE stretching 8  10  8  8  8  10  10         STM 7 5  8  8  8  5 ---                                       Exercise Diary            Pelvic tilt 20  30  30  40  ------> 30   30         Supine rotation 2/20  3/20  6# 3/20  6# 3/20  ------>  6#  3/20  6#  3/20         T-band Abd 3/10  LV3  3/10 LV3  3/10 LV3  3/10 LV3  ------>  L 3  3/10  L 3  3/10                                                         Bike  20  12  20'  20'  20'  20'  20'          pulleys      5 m                       Modalities            IFC / HP 15  18  18  18  18  18  18          US ([email or web address]  8)  10        10  10                                          Assessment: Tolerated treatment well  Patient had less pain post treatment      Plan: Continue per plan of care

## 2018-07-06 ENCOUNTER — EVALUATION (OUTPATIENT)
Dept: PHYSICAL THERAPY | Facility: CLINIC | Age: 68
End: 2018-07-06
Payer: MEDICARE

## 2018-07-06 ENCOUNTER — TRANSCRIBE ORDERS (OUTPATIENT)
Dept: PHYSICAL THERAPY | Facility: CLINIC | Age: 68
End: 2018-07-06

## 2018-07-06 DIAGNOSIS — G89.29 CHRONIC PAIN OF LEFT KNEE: ICD-10-CM

## 2018-07-06 DIAGNOSIS — G89.29 CHRONIC PAIN OF RIGHT KNEE: Primary | ICD-10-CM

## 2018-07-06 DIAGNOSIS — M25.562 CHRONIC PAIN OF LEFT KNEE: ICD-10-CM

## 2018-07-06 DIAGNOSIS — M25.561 CHRONIC PAIN OF RIGHT KNEE: Primary | ICD-10-CM

## 2018-07-06 PROCEDURE — 97162 PT EVAL MOD COMPLEX 30 MIN: CPT | Performed by: PHYSICAL THERAPIST

## 2018-07-06 PROCEDURE — G8979 MOBILITY GOAL STATUS: HCPCS | Performed by: PHYSICAL THERAPIST

## 2018-07-06 PROCEDURE — 97140 MANUAL THERAPY 1/> REGIONS: CPT | Performed by: PHYSICAL THERAPIST

## 2018-07-06 PROCEDURE — 97110 THERAPEUTIC EXERCISES: CPT | Performed by: PHYSICAL THERAPIST

## 2018-07-06 PROCEDURE — G8978 MOBILITY CURRENT STATUS: HCPCS | Performed by: PHYSICAL THERAPIST

## 2018-07-06 NOTE — LETTER
2018    Brittany Cobb MD  [address] [facility] [address]    Patient: Sukumar Saunders   YOB: 1950   Date of Visit: 2018     Encounter Diagnosis     ICD-10-CM    1  Chronic pain of right knee M25 561     G89 29    2  Chronic pain of left knee M25 562     G89 29        Dear Dr Sahyne Fofana:    Please review the attached Plan of Care from [facility] recent visit  Please verify that you agree therapy should continue by signing the attached document and sending it back to our office  If you have any questions or concerns, please don't hesitate to call  Sincerely,    Iraj Jaimes, PT      Referring Provider:      I certify that I have read the below Plan of Care and certify the need for these services furnished under this plan of treatment while under my care  Brittany Cobb MD   [address] [facility] [address]  [address] Mail          PT Evaluation     Today's date: 2018  Patient name: Sukumar Saunders  : 1950  MRN: [id number]  Referring provider: Ester Huang MD  Dx:   Encounter Diagnosis     ICD-10-CM    1  Chronic pain of right knee M25 561     G89 29    2  Chronic pain of left knee M25 562     G89 29        Start Time: 0950  Stop Time: 1100  Total time in clinic (min): 70 minutes    Assessment    Assessment details: Sukumar Saunders 79 y o  female presents recent exacerbation of bilateral knee pain, L>R  Patient presents stiffness in both knees, hips and lower back  She complains of medial and posterior knee pain, left > right    The patient has a history of bilateral total hip replacements  Both appear stable  Patient also has experienced lumbar pain which has   Caused  some flexed posture when walking  Left Pain rated   6/10; at worse with extended sitting    Both  Hip AROM: F= 98    ABD= 35   EXT= 8  ER= 30  IR=  neutral  Hip Strength: F  4  Abd  -4  E +4  R knee ROM F 115 E -2  L knee ROM F 112 E -6  B Knee strength  F -4   E -4  Weakness in VMO; poor left quad set  Patient is ambulating without device  Gait pattern is compromised with initial standing after sitting, but improves with further ambulation  Functional difficulty include steps and getting in and out of car, soft or low seats  Barriers to therapy: general OA; multiple joint replacements (both hips and right shoulder); DJD both knees; obesity lumbar DDD and stenosis     STG  Reduce bilateral  pain by 50% 2-3 weeks  Increase ROM in both knees- emphasis on achieving terminal extension  Increase strength both knees 1/2 grade 3-4 weeks  LTG  Improve gait pattern and ambulation tolerance  Establish effective HEP  Resume normal functional activities     Plan   2 X per week X 4 weeks  Modalities, prn  MT LE stretching; STM  TE - stretching LE strengthening include HEP                 Subjective Patient received injections to knees 6/27/2018, and PT was recommended  Objective   Left Pain rated   6/10; at worse with extended sitting    Both  Hip AROM: F= 98    ABD= 35   EXT= 8  ER= 30  IR=  neutral  Hip Strength: F  4  Abd  -4  E +4  R knee ROM F 115 E -2  L knee ROM F 112 E -6  B Knee strength  F -4   E -4  Weakness in VMO; poor left quad set      Flowsheet Rows      Most Recent Value   PT/OT G-Codes   FOTO information reviewed  N/A   Assessment Type  Evaluation   G code set  Mobility: Walking & Moving Around   Mobility: Walking and Moving Around Current Status ()  CK   Mobility: Walking and Moving Around Goal Status ()  CI            Manual  7/6            LE stretching TK            Lumbar flexibility TK                             Exercise Diary  7/6            Stretch strap - seated 5X 10            Standing back ext 10X            Quad sets             SAQ LAQ             T-band hams                          Recumbent bike 1o m LV1            LF back ext 45lb 2/15

## 2018-07-06 NOTE — PROGRESS NOTES
PT Evaluation     Today's date: 2018  Patient name: Nyla Ruvalcaba  : 1950  MRN: [id number]  Referring provider: Cheryle Gaw, MD  Dx:   Encounter Diagnosis     ICD-10-CM    1  Chronic pain of right knee M25 561     G89 29    2  Chronic pain of left knee M25 562     G89 29        Start Time: 0950  Stop Time: 1100  Total time in clinic (min): 70 minutes    Assessment    Assessment details: Nyla Ruvalcaba 79 y o  female presents recent exacerbation of bilateral knee pain, L>R  Patient presents stiffness in both knees, hips and lower back  She complains of medial and posterior knee pain, left > right    The patient has a history of bilateral total hip replacements  Both appear stable  Patient also has experienced lumbar pain which has   Caused  some flexed posture when walking  Left Pain rated   6/10; at worse with extended sitting    Both  Hip AROM: F= 98    ABD= 35   EXT= 8  ER= 30  IR=  neutral  Hip Strength: F  4  Abd  -4  E +4  R knee ROM F 115 E -2  L knee ROM F 112 E -6  B Knee strength  F -4   E -4  Weakness in VMO; poor left quad set  Patient is ambulating without device  Gait pattern is compromised with initial standing after sitting, but improves with further ambulation  Functional difficulty include steps and getting in and out of car, soft or low seats  Barriers to therapy: general OA; multiple joint replacements (both hips and right shoulder); DJD both knees; obesity lumbar DDD and stenosis     STG  Reduce bilateral  pain by 50% 2-3 weeks  Increase ROM in both knees- emphasis on achieving terminal extension  Increase strength both knees 1/2 grade 3-4 weeks  LTG  Improve gait pattern and ambulation tolerance  Establish effective HEP  Resume normal functional activities     Plan   2 X per week X 4 weeks  Modalities, prn  MT LE stretching; STM  TE - stretching LE strengthening include HEP                 Subjective Patient received injections to knees 2018, and PT was recommended  Objective   Left Pain rated   6/10; at worse with extended sitting    Both  Hip AROM: F= 98    ABD= 35   EXT= 8  ER= 30  IR=  neutral  Hip Strength: F  4  Abd  -4  E +4  R knee ROM F 115 E -2  L knee ROM F 112 E -6  B Knee strength  F -4   E -4  Weakness in VMO; poor left quad set      Flowsheet Rows      Most Recent Value   PT/OT G-Codes   FOTO information reviewed  N/A   Assessment Type  Evaluation   G code set  Mobility: Walking & Moving Around   Mobility: Walking and Moving Around Current Status ()  CK   Mobility: Walking and Moving Around Goal Status ()  CI            Manual  7/6            LE stretching TK            Lumbar flexibility TK                             Exercise Diary  7/6            Stretch strap - seated 5X 10            Standing back ext 10X            Quad sets             SAQ LAQ             T-band hams                          Recumbent bike 1o m LV1            LF back ext 45lb 2/15

## 2018-07-10 ENCOUNTER — OFFICE VISIT (OUTPATIENT)
Dept: PHYSICAL THERAPY | Facility: CLINIC | Age: 68
End: 2018-07-10
Payer: MEDICARE

## 2018-07-10 DIAGNOSIS — G89.29 CHRONIC PAIN OF RIGHT KNEE: Primary | ICD-10-CM

## 2018-07-10 DIAGNOSIS — M25.561 CHRONIC PAIN OF RIGHT KNEE: Primary | ICD-10-CM

## 2018-07-10 DIAGNOSIS — G89.29 CHRONIC PAIN OF LEFT KNEE: ICD-10-CM

## 2018-07-10 DIAGNOSIS — M25.562 CHRONIC PAIN OF LEFT KNEE: ICD-10-CM

## 2018-07-10 PROCEDURE — 97140 MANUAL THERAPY 1/> REGIONS: CPT | Performed by: PHYSICAL THERAPIST

## 2018-07-10 PROCEDURE — 97110 THERAPEUTIC EXERCISES: CPT | Performed by: PHYSICAL THERAPIST

## 2018-07-10 NOTE — PROGRESS NOTES
Daily Note     Today's date: 7/10/2018  Patient name: Michael Arredondo  : 1950  MRN: [id number]  Referring provider: Fiorella Morin MD  Dx:   Encounter Diagnosis     ICD-10-CM    1  Chronic pain of right knee M25 561     G89 29    2  Chronic pain of left knee M25 562     G89 29        Start Time: 1000  Stop Time: 1100  Total time in clinic (min): 60 minutes    Subjective: Patient complaining of left knee pain while walking  Objective: PT performed LE stretching and directed exercise program one-on-one  Manual  7/6  7/10                   LE stretching TK  TK                   Lumbar flexibility TK  TK                                                 Exercise Diary  7/6  7/10                   Stretch strap - seated 5X 10                     Standing back ext 10X                     Quad sets    3/10                   SAQ LAQ    3# 3/10                   T-band hams    LV3 2/10                                           Recumbent bike 1o m LV1  10m LV3                   LF back ext 45lb 2/15                                                                                                                                                                 Assessment: Tolerated treatment well  Patient encouraged to give the exercises time  Plan: Continue per plan of care

## 2018-07-13 ENCOUNTER — OFFICE VISIT (OUTPATIENT)
Dept: PHYSICAL THERAPY | Facility: CLINIC | Age: 68
End: 2018-07-13
Payer: MEDICARE

## 2018-07-13 DIAGNOSIS — G89.29 CHRONIC PAIN OF LEFT KNEE: Primary | ICD-10-CM

## 2018-07-13 DIAGNOSIS — M25.551 RIGHT HIP PAIN: ICD-10-CM

## 2018-07-13 DIAGNOSIS — M25.561 CHRONIC PAIN OF RIGHT KNEE: ICD-10-CM

## 2018-07-13 DIAGNOSIS — M25.562 CHRONIC PAIN OF LEFT KNEE: Primary | ICD-10-CM

## 2018-07-13 DIAGNOSIS — G89.29 CHRONIC PAIN OF RIGHT KNEE: ICD-10-CM

## 2018-07-13 PROCEDURE — 97035 APP MDLTY 1+ULTRASOUND EA 15: CPT | Performed by: PHYSICAL THERAPIST

## 2018-07-13 PROCEDURE — 97140 MANUAL THERAPY 1/> REGIONS: CPT | Performed by: PHYSICAL THERAPIST

## 2018-07-13 PROCEDURE — 97014 ELECTRIC STIMULATION THERAPY: CPT | Performed by: PHYSICAL THERAPIST

## 2018-07-13 NOTE — PROGRESS NOTES
Daily Note     Today's date: 2018  Patient name: Carole Dolan  : 1950  MRN: [id number]  Referring provider: Nicole Walker MD  Dx:   Encounter Diagnosis     ICD-10-CM    1  Chronic pain of left knee M25 562     G89 29    2  Chronic pain of right knee M25 561     G89 29    3  Right hip pain M25 551        Start Time: 0930  Stop Time: 1025  Total time in clinic (min): 55 minutes    Subjective: Patient is having increased right hip and groin pain with weight bearing, and is frustrated with her immobility  The patient has called [facility], and is awaiting a return call  She has been performing her knee stretching exercises at home  Objective: PT had to provide analgesic modalities to her right hip and L/S region today  Held on exercises today     Manual  7/6  7/10  7/12                 LE stretching TK  TK  TK                 Lumbar flexibility TK  TK  TK                  STM      15                       Exercise Diary  7/6  7/10  7/12                 Stretch strap - seated 5X 10                     Standing back ext 10X                     Quad sets    3/10                   SAQ LAQ    3# 3/10                   T-band hams    LV3 2/10                                           Recumbent bike 1o m LV1  10m LV3                   LF back ext 45lb 2/15                                                                      MODALITIES                         [email or web address]      15                  IFC HP      20                                                  Assessment:    Patient responded well to modalities and STM      Plan: Reassess pain issues next visit

## 2018-07-17 ENCOUNTER — APPOINTMENT (OUTPATIENT)
Dept: PHYSICAL THERAPY | Facility: CLINIC | Age: 68
End: 2018-07-17
Payer: MEDICARE

## 2018-07-20 ENCOUNTER — APPOINTMENT (OUTPATIENT)
Dept: PHYSICAL THERAPY | Facility: CLINIC | Age: 68
End: 2018-07-20
Payer: MEDICARE

## 2018-07-27 ENCOUNTER — OFFICE VISIT (OUTPATIENT)
Dept: PHYSICAL THERAPY | Facility: CLINIC | Age: 68
End: 2018-07-27
Payer: MEDICARE

## 2018-07-27 ENCOUNTER — TRANSCRIBE ORDERS (OUTPATIENT)
Dept: PHYSICAL THERAPY | Facility: CLINIC | Age: 68
End: 2018-07-27

## 2018-07-27 DIAGNOSIS — M48.061 LUMBAR FORAMINAL STENOSIS: Primary | ICD-10-CM

## 2018-07-27 DIAGNOSIS — M51.36 DEGENERATION OF LUMBAR INTERVERTEBRAL DISC: ICD-10-CM

## 2018-07-27 PROCEDURE — G8978 MOBILITY CURRENT STATUS: HCPCS | Performed by: PHYSICAL THERAPIST

## 2018-07-27 PROCEDURE — 97140 MANUAL THERAPY 1/> REGIONS: CPT | Performed by: PHYSICAL THERAPIST

## 2018-07-27 PROCEDURE — 97110 THERAPEUTIC EXERCISES: CPT | Performed by: PHYSICAL THERAPIST

## 2018-07-27 PROCEDURE — 97014 ELECTRIC STIMULATION THERAPY: CPT | Performed by: PHYSICAL THERAPIST

## 2018-07-27 PROCEDURE — G8979 MOBILITY GOAL STATUS: HCPCS | Performed by: PHYSICAL THERAPIST

## 2018-07-27 NOTE — LETTER
2018    Ramya Jennings, [address] [facility] [address]    Patient: Sukumar Saunders   YOB: 1950   Date of Visit: 2018     Encounter Diagnosis     ICD-10-CM    1  Lumbar foraminal stenosis M99 83    2  Degeneration of lumbar intervertebral disc M51 36        Dear Dr Jessenia Valverde:    Please review the attached Plan of Care from [facility] recent visit  Please verify that you agree therapy should continue by signing the attached document and sending it back to our office  If you have any questions or concerns, please don't hesitate to call  Sincerely,    Iraj Jaimes, PT      Referring Provider:      I certify that I have read the below Plan of Care and certify the need for these services furnished under this plan of treatment while under my care  Ramya Jennings MD  [address] [address]: [phone]          PT Re-Evaluation     Today's date: 2018  Patient name: Sukumar Saunders  : 1950  MRN: [id number]  Referring provider: Shena Giang MD  Dx:   Encounter Diagnosis     ICD-10-CM    1  Lumbar foraminal stenosis M99 83    2  Degeneration of lumbar intervertebral disc M51 36        Start Time: 1000  Stop Time: 1100  Total time in clinic (min): 60 minutes    Assessment    Assessment details: Sukumar Saunders 79 y o  female presents recent exacerbation of chronic lower back pain related to degenerative changes in her lumbar spine  Recent CAT scan revealed extensive degenerative changes at L3-L4, L4-L5 and L5-S1 with significant stenosis at L3-L4 and L4-L5 ,  Patient complains of relatively mild localized lumbar pain, but expresses a significant limitation in standing, walking and general mobility  The patient presents a flexed lumbar posture with extended walking activity  Pain rated   3/10; at worse with when rising after extended sitting  and with standing and walking   The patient has started taking Gabapentin which seems to be helping  Reduce her symptoms  Localized lumbar pain with left upper gluteal pain  Lumbar ROM F 55/60 E 8/15 RSB 20/40 LSB 35/40 Hamstring flexibility is good  Both  Hip AROM: F= 98    ABD= 35   EXT= 8  ER= 30  IR=  neutral  Hip Strength: F  4  Abd  -4  E +4  R knee ROM F 115 E -2  L knee ROM F 112 E -6  B Knee strength  F -4   E -4  Weakness in VMO; poor left quad set  Weakness in core stability and lumbar and scapular extensors  Patient is ambulating without device  Gait pattern is compromised with initial standing after sitting, as noted  , but becomes further compromised with extended ambulation  Standing tolerance 5 min  Walking tolerance 8- 10 min  Functional difficulty include steps and getting in and out of car, soft or low seats    Barriers to therapy: general OA; multiple joint replacements (both hips and right shoulder); DJD both knees; obesity lumbar DDD and stenosis     STG  Reduce lumbar  Pain during weight bearing by 50% 2-3 weeks  Increase strength in core stability and scopula  stabilizers   3-4 weeks  LTG  Improve gait pattern and ambulation tolerance  Promote back care management through appropriate exercise  Establish effective HEP  Resume normal functional activities      Plan  Plan details: 2-3 times per week 4-6 weeks    TE- core stability and lumbar / scapular extension, HEP         General fitness  Modalities for pain  MT STM         Subjective Patient has been experiencing limitations in mobility secondary to LBP    Objective     Pain rated   3/10; at worse with when rising after extended sitting  and with standing and walking  The patient has started taking Gabapentin which seems to be helping  Reduce her symptoms  Localized lumbar pain with left upper gluteal pain  Lumbar ROM F 55/60 E 8/15 RSB 20/40 LSB 35/40 Hamstring flexibility is good               Manual  7/27            STM 12                             Exercise Diary  7/27 Jameel Ex 1/10 5'            LF back extension 25 lbs 2/15                                      Core Stability             Pelvic tilt             T-band abd             Supine rotation                                                                                  Modalities  7/27            IFC / HP (prone) 18

## 2018-07-27 NOTE — PROGRESS NOTES
PT Re-Evaluation     Today's date: 2018  Patient name: Tyron Loja  : 1950  MRN: [id number]  Referring provider: Carmenza Babin MD  Dx:   Encounter Diagnosis     ICD-10-CM    1  Lumbar foraminal stenosis M99 83    2  Degeneration of lumbar intervertebral disc M51 36        Start Time: 1000  Stop Time: 1100  Total time in clinic (min): 60 minutes    Assessment    Assessment details: Tyron Loja 79 y o  female presents recent exacerbation of chronic lower back pain related to degenerative changes in her lumbar spine  Recent CAT scan revealed extensive degenerative changes at L3-L4, L4-L5 and L5-S1 with significant stenosis at L3-L4 and L4-L5 ,  Patient complains of relatively mild localized lumbar pain, but expresses a significant limitation in standing, walking and general mobility  The patient presents a flexed lumbar posture with extended walking activity  Pain rated   3/10; at worse with when rising after extended sitting  and with standing and walking  The patient has started taking Gabapentin which seems to be helping  Reduce her symptoms  Localized lumbar pain with left upper gluteal pain  Lumbar ROM F 55/60 E 8/15 RSB 20/40 LSB 35/40 Hamstring flexibility is good  Both  Hip AROM: F= 98    ABD= 35   EXT= 8  ER= 30  IR=  neutral  Hip Strength: F  4  Abd  -4  E +4  R knee ROM F 115 E -2  L knee ROM F 112 E -6  B Knee strength  F -4   E -4  Weakness in VMO; poor left quad set  Weakness in core stability and lumbar and scapular extensors  Patient is ambulating without device  Gait pattern is compromised with initial standing after sitting, as noted  , but becomes further compromised with extended ambulation  Standing tolerance 5 min  Walking tolerance 8- 10 min  Functional difficulty include steps and getting in and out of car, soft or low seats    Barriers to therapy: general OA; multiple joint replacements (both hips and right shoulder); DJD both knees; obesity lumbar DDD and stenosis     STG  Reduce lumbar  Pain during weight bearing by 50% 2-3 weeks  Increase strength in core stability and scopula  stabilizers   3-4 weeks  LTG  Improve gait pattern and ambulation tolerance  Promote back care management through appropriate exercise  Establish effective HEP  Resume normal functional activities      Plan  Plan details: 2-3 times per week 4-6 weeks    TE- core stability and lumbar / scapular extension, HEP         General fitness  Modalities for pain  MT STM         Subjective Patient has been experiencing limitations in mobility secondary to LBP    Objective     Pain rated   3/10; at worse with when rising after extended sitting  and with standing and walking  The patient has started taking Gabapentin which seems to be helping  Reduce her symptoms  Localized lumbar pain with left upper gluteal pain  Lumbar ROM F 55/60 E 8/15 RSB 20/40 LSB 35/40 Hamstring flexibility is good               Manual  7/27            STM 12                             Exercise Diary  7/27            Jameel Ex 1/10 5'            LF back extension 25 lbs 2/15                                      Core Stability             Pelvic tilt             T-band abd             Supine rotation                                                                                  Modalities  7/27            IFC / HP (prone) 18

## 2018-07-30 ENCOUNTER — APPOINTMENT (OUTPATIENT)
Dept: PHYSICAL THERAPY | Facility: CLINIC | Age: 68
End: 2018-07-30
Payer: MEDICARE

## 2018-08-01 ENCOUNTER — APPOINTMENT (OUTPATIENT)
Dept: PHYSICAL THERAPY | Facility: CLINIC | Age: 68
End: 2018-08-01
Payer: MEDICARE

## 2018-08-02 ENCOUNTER — OFFICE VISIT (OUTPATIENT)
Dept: PHYSICAL THERAPY | Facility: CLINIC | Age: 68
End: 2018-08-02
Payer: MEDICARE

## 2018-08-02 DIAGNOSIS — M48.061 LUMBAR FORAMINAL STENOSIS: Primary | ICD-10-CM

## 2018-08-02 DIAGNOSIS — M51.36 DEGENERATION OF LUMBAR INTERVERTEBRAL DISC: ICD-10-CM

## 2018-08-02 PROCEDURE — 97014 ELECTRIC STIMULATION THERAPY: CPT

## 2018-08-02 PROCEDURE — 97110 THERAPEUTIC EXERCISES: CPT

## 2018-08-02 NOTE — PROGRESS NOTES
Daily Note     Today's date: 2018  Patient name: Yasmine Finney  : 1950  MRN: [id number]  Referring provider: Treva Moore MD  Dx:   Encounter Diagnosis     ICD-10-CM    1  Lumbar foraminal stenosis M99 83    2  Degeneration of lumbar intervertebral disc M51 36        Start Time: 1000  Stop Time: 1048  Total time in clinic (min): 48 minutes    Subjective: Pt reports slightly less sx after tx last visit  Objective: See treatment diary below  Progressed pt per POC  STM deferred due to pt's time restraints  Manual                     STM 12  --->                                                 Exercise Diary                     Robbery Ex 1/10 5' "                    LF back extension 25 lbs 2/15  25#  2/15                                                                   Core Stability                       Pelvic tilt    20                   T-band abd    L3  2/10                   Supine rotation                                                                                                                                                     Assessment: Tolerated treatment well  Patient exhibited good technique with therapeutic exercises      Plan: Continue per plan of care

## 2018-08-03 ENCOUNTER — OFFICE VISIT (OUTPATIENT)
Dept: PHYSICAL THERAPY | Facility: CLINIC | Age: 68
End: 2018-08-03
Payer: MEDICARE

## 2018-08-03 DIAGNOSIS — M48.061 LUMBAR FORAMINAL STENOSIS: Primary | ICD-10-CM

## 2018-08-03 DIAGNOSIS — M51.36 DEGENERATION OF LUMBAR INTERVERTEBRAL DISC: ICD-10-CM

## 2018-08-03 PROCEDURE — 97140 MANUAL THERAPY 1/> REGIONS: CPT

## 2018-08-03 PROCEDURE — 97110 THERAPEUTIC EXERCISES: CPT

## 2018-08-03 PROCEDURE — 97014 ELECTRIC STIMULATION THERAPY: CPT

## 2018-08-03 NOTE — PROGRESS NOTES
Daily Note     Today's date: 8/3/2018  Patient name: Zaire Connell  : 1950  MRN: [id number]  Referring provider: Pauly Nixon MD  Dx:   Encounter Diagnosis     ICD-10-CM    1  Lumbar foraminal stenosis M99 83    2  Degeneration of lumbar intervertebral disc M51 36        Start Time: 1000  Stop Time: 1100  Total time in clinic (min): 60 minutes    Subjective: Pt rports decreased back pain yesterday after tx  Objective: See treatment diary below: Added gastroc, k-c,piriformis, hamstring stretches  Manual  7/27  8/2  8/3                 STM 12  --->  -->                  Stretch      12                       Exercise Diary  7/27  8/2  8/3                 Jameel Ex 10 5" 1/10  5"   1/10  5"                 LF back extension 25# 2/15  25#  2/15  25#  2/15                                                                 Core Stability                       Pelvic tilt    20  30                 T-band abd    L3  2/10  L4  2/10                 Supine rotation     1 5#  20                                                                                                                                                   Assessment: Tolerated treatment well  Patient exhibited good technique with therapeutic exercises      Plan: Continue per plan of care

## 2018-08-06 ENCOUNTER — OFFICE VISIT (OUTPATIENT)
Dept: PHYSICAL THERAPY | Facility: CLINIC | Age: 68
End: 2018-08-06
Payer: MEDICARE

## 2018-08-06 DIAGNOSIS — M48.061 LUMBAR FORAMINAL STENOSIS: Primary | ICD-10-CM

## 2018-08-06 DIAGNOSIS — M51.36 DEGENERATION OF LUMBAR INTERVERTEBRAL DISC: ICD-10-CM

## 2018-08-06 PROCEDURE — 97140 MANUAL THERAPY 1/> REGIONS: CPT

## 2018-08-06 PROCEDURE — 97014 ELECTRIC STIMULATION THERAPY: CPT

## 2018-08-06 PROCEDURE — 97110 THERAPEUTIC EXERCISES: CPT

## 2018-08-06 NOTE — PROGRESS NOTES
Daily Note     Today's date: 2018  Patient name: Raheem Rose  : 1950  MRN: [id number]  Referring provider: Isidra Giles MD  Dx:   Encounter Diagnosis     ICD-10-CM    1  Lumbar foraminal stenosis M99 83    2  Degeneration of lumbar intervertebral disc M51 36        Start Time: 1030  Stop Time: 1130  Total time in clinic (min): 60 minutes    Subjective:Pt comments on  feeling a slight pulling after performing the back extension last visit  She requests holding today  Objective: See treatment diary below  Manual  7/27  8/2  8/3  8/6               STM 12  --->  -->                  Stretch      12  12                     Exercise Diary  7/27  8/2  8/3 8/6                Robbery Ex 1/10 5" 1/10  5"   1/10  5" 1/10  5"                LF back extension 25# 2/15  25#  2/15  25#  2/15  --->                                                               Core Stability                       Pelvic tilt    20  30 40               T-band abd    L3  2/10  L4  2/10  L4  3/10               Supine rotation     1 5#    2#                                                                                                                                                   Assessment: Tolerated treatment well  Patient exhibited good technique with therapeutic exercises      Plan: Continue per plan of care

## 2018-08-08 ENCOUNTER — OFFICE VISIT (OUTPATIENT)
Dept: PHYSICAL THERAPY | Facility: CLINIC | Age: 68
End: 2018-08-08
Payer: MEDICARE

## 2018-08-08 DIAGNOSIS — M51.36 DEGENERATION OF LUMBAR INTERVERTEBRAL DISC: ICD-10-CM

## 2018-08-08 DIAGNOSIS — M48.061 LUMBAR FORAMINAL STENOSIS: Primary | ICD-10-CM

## 2018-08-08 PROCEDURE — 97014 ELECTRIC STIMULATION THERAPY: CPT

## 2018-08-08 PROCEDURE — 97140 MANUAL THERAPY 1/> REGIONS: CPT

## 2018-08-08 PROCEDURE — 97110 THERAPEUTIC EXERCISES: CPT

## 2018-08-08 NOTE — PROGRESS NOTES
Daily Note     Today's date: 2018  Patient name: Carole Dolan  : 1950  MRN: [id number]  Referring provider: Carissa Gaxiola MD  Dx:   Encounter Diagnosis     ICD-10-CM    1  Lumbar foraminal stenosis M99 83    2  Degeneration of lumbar intervertebral disc M51 36        Start Time: 1400  Stop Time: 1410  Total time in clinic (min): 10 minutes    Subjective: Pt comments on less back discomfort after holding of the LF back extension last visit  Objective: See treatment diary below  Manual  7/27  8/2  8/3  8/6  8/8             STM 12  --->  -->    10              Stretch      12  12  12                   Exercise Diary  7/27  8/2  8/3 8/6  8/8              Princessy Ex 10 5" 1/10  5"   1/10  5" 1/10  5"  1/10  5"              LF back extension 25# 2/15  25#  2/15  25#  /15  --->  xxx                                                             Core Stability                       Pelvic tilt    20  30 40  40             T-band abd    L3  2/10  L4  2/10  L4  3/10  L4  3/10             Supine rotation     1 5#    2#    2#                                                                                                                                              Assessment: Tolerated treatment well  Patient exhibited good technique with therapeutic exercises      Plan: Continue per plan of care  Consider addition of bike/ellipitical next visit

## 2018-08-14 ENCOUNTER — OFFICE VISIT (OUTPATIENT)
Dept: PHYSICAL THERAPY | Facility: CLINIC | Age: 68
End: 2018-08-14
Payer: MEDICARE

## 2018-08-14 ENCOUNTER — TRANSCRIBE ORDERS (OUTPATIENT)
Dept: PHYSICAL THERAPY | Facility: CLINIC | Age: 68
End: 2018-08-14

## 2018-08-14 DIAGNOSIS — M51.36 DEGENERATION OF LUMBAR INTERVERTEBRAL DISC: ICD-10-CM

## 2018-08-14 DIAGNOSIS — M25.551 RIGHT HIP PAIN: ICD-10-CM

## 2018-08-14 DIAGNOSIS — M48.061 LUMBAR FORAMINAL STENOSIS: Primary | ICD-10-CM

## 2018-08-14 DIAGNOSIS — M48.061 NEURAL FORAMINAL STENOSIS OF LUMBAR SPINE: Primary | ICD-10-CM

## 2018-08-14 PROCEDURE — 97140 MANUAL THERAPY 1/> REGIONS: CPT | Performed by: PHYSICAL THERAPIST

## 2018-08-14 PROCEDURE — G8979 MOBILITY GOAL STATUS: HCPCS | Performed by: PHYSICAL THERAPIST

## 2018-08-14 PROCEDURE — 97110 THERAPEUTIC EXERCISES: CPT | Performed by: PHYSICAL THERAPIST

## 2018-08-14 PROCEDURE — G8978 MOBILITY CURRENT STATUS: HCPCS | Performed by: PHYSICAL THERAPIST

## 2018-08-14 NOTE — PROGRESS NOTES
PT Re-Evaluation     Today's date: 2018  Patient name: Madeleine Kendall  : 1950  MRN: [id number]  Referring provider: Alyssa Ray MD  Dx:   Encounter Diagnosis     ICD-10-CM    1  Lumbar foraminal stenosis M99 83    2  Degeneration of lumbar intervertebral disc M51 36    3  Right hip pain M25 551        Start Time: 1030  Stop Time: 1126  Total time in clinic (min): 56 minutes    Assessment    Assessment details:  Madeleine Kendall 79 y o  female with a recent exacerbation of chronic lower back pain related to degenerative changes in her lumbar spine has been seen 8 times in outpatient physical therapy  The patient reduction in lumbar pain, and  notes improvement in lumbar flexibility  Pain rated   2/10; at worse with when rising after extended sitting  and with standing and walking, but this activity is improved    The patient continues Gabapentin continues to be helping to reduce symptoms  Localized lumbar pain with left upper gluteal pain  Lumbar ROM F 55/60 E 10/15 RSB 30/40 LSB 35/40 Hamstring flexibility is good  Both  Hip AROM: F= 98    ABD= 38  EXT= 10 ER= 30  IR=  neutral  Hip Strength: F + 4  Abd  -4  E +4  R knee ROM F 115 E 0  L knee ROM F 115 E -2  B Knee strength  F -4   E -4  Weakness in VMO; poor left quad set  Weakness in core stability and lumbar and scapular extensors  Patient is ambulating without device  Gait pattern is compromised with initial standing after sitting, as noted  , but becomes further compromised with extended ambulation  Standing tolerance 8 min  Walking tolerance 12 min  Functional: less difficulty include steps and getting in and out of car, soft or low seats    Barriers to therapy: general OA; multiple joint replacements (both hips and right shoulder); DJD both knees; obesity lumbar DDD and stenosis     STG  Reduce lumbar  Pain during weight bearing by 50% 2-3 weeks - Achieved  Increase strength in core stability and scopula  stabilizers   3-4 weeks  LTG  Improve gait pattern and ambulation tolerance  Promote back care management through appropriate exercise  Establish effective HEP  Resume normal functional activities       Plan  Continue 2-3 times per week 4 weeks  LE stretching  TE - core stability, LE strengthening  general fitness        Subjective Patient notes overall improvement    Objective   Lumbar ROM F 55/60 E 10/15 RSB 30/40 LSB 35/40 Hamstring flexibility is good     Both  Hip AROM: F= 98    ABD= 38  EXT= 10 ER= 30  IR=  neutral  Hip Strength: F + 4  Abd  -4  E +4  R knee ROM F 115 E 0  L knee ROM F 115 E -2  B Knee strength  F -4   E -4      Flowsheet Rows      Most Recent Value   PT/OT G-Codes   FOTO information reviewed  N/A   Assessment Type  Re-evaluation   G code set  Mobility: Walking & Moving Around   Mobility: Walking and Moving Around Current Status ()  CI   Mobility: Walking and Moving Around Goal Status ()  [facility]          Manual  7/27  8/2  8/3  8/6  8/8  8/14           STM 12  --->  -->    10              Stretch      12  12  12  12                 Exercise Diary  7/27 8/2  8/3 8/6 8/8              Princessy Ex 1/10 5" 1/10  5"   1/10  5" 1/10  5"  1/10  5"              LF back extension 25# 2/15  25#  2/15  25#  2/15  --->  xxx                                                             Core Stability                       Pelvic tilt    20  30 40  40             T-band abd    L3  2/10  L4  2/10  L4  3/10  L4  3/10             Supine rotation   2/20  1 5#  2/20  2#  2/20  2#  2/20                                      Bike           5min LV1

## 2018-08-14 NOTE — LETTER
2018    Yaneli Agustin MD  [address] [facility] [address]    Patient: Bernie Cifuentes   YOB: 1950   Date of Visit: 2018     Encounter Diagnosis     ICD-10-CM    1  Lumbar foraminal stenosis M99 83    2  Degeneration of lumbar intervertebral disc M51 36    3  Right hip pain M25 551        Dear Dr Veola Halsted:    Please review the attached Plan of Care from [facility] recent visit  Please verify that you agree therapy should continue by signing the attached document and sending it back to our office  If you have any questions or concerns, please don't hesitate to call  Sincerely,    Geovani Hebert, PT      Referring Provider:      I certify that I have read the below Plan of Care and certify the need for these services furnished under this plan of treatment while under my care  Yaneli Agustin MD  [address] [facility] [address]: [phone]          PT Re-Evaluation     Today's date: 2018  Patient name: Bernie Cifuentes  : 1950  MRN: [id number]  Referring provider: Kaylee Brooks MD  Dx:   Encounter Diagnosis     ICD-10-CM    1  Lumbar foraminal stenosis M99 83    2  Degeneration of lumbar intervertebral disc M51 36    3  Right hip pain M25 551        Start Time: 1030  Stop Time: 1126  Total time in clinic (min): 56 minutes    Assessment    Assessment details:  Bernie Cifuentes 79 y o  female with a recent exacerbation of chronic lower back pain related to degenerative changes in her lumbar spine has been seen 8 times in outpatient physical therapy  The patient reduction in lumbar pain, and  notes improvement in lumbar flexibility  Pain rated   2/10; at worse with when rising after extended sitting  and with standing and walking, but this activity is improved    The patient continues Gabapentin continues to be helping to reduce symptoms    Localized lumbar pain with left upper gluteal pain  Lumbar ROM F 55/60 E 10/15 RSB 30/40 LSB 35/40 Hamstring flexibility is good  Both  Hip AROM: F= 98    ABD= 38  EXT= 10 ER= 30  IR=  neutral  Hip Strength: F + 4  Abd  -4  E +4  R knee ROM F 115 E 0  L knee ROM F 115 E -2  B Knee strength  F -4   E -4  Weakness in VMO; poor left quad set  Weakness in core stability and lumbar and scapular extensors  Patient is ambulating without device  Gait pattern is compromised with initial standing after sitting, as noted  , but becomes further compromised with extended ambulation  Standing tolerance 8 min  Walking tolerance 12 min  Functional: less difficulty include steps and getting in and out of car, soft or low seats    Barriers to therapy: general OA; multiple joint replacements (both hips and right shoulder); DJD both knees; obesity lumbar DDD and stenosis     STG  Reduce lumbar  Pain during weight bearing by 50% 2-3 weeks - Achieved  Increase strength in core stability and scopula  stabilizers   3-4 weeks  LTG  Improve gait pattern and ambulation tolerance  Promote back care management through appropriate exercise  Establish effective HEP  Resume normal functional activities       Plan  Continue 2-3 times per week 4 weeks  LE stretching  TE - core stability, LE strengthening  general fitness        Subjective Patient notes overall improvement    Objective   Lumbar ROM F 55/60 E 10/15 RSB 30/40 LSB 35/40 Hamstring flexibility is good     Both  Hip AROM: F= 98    ABD= 38  EXT= 10 ER= 30  IR=  neutral  Hip Strength: F + 4  Abd  -4  E +4  R knee ROM F 115 E 0  L knee ROM F 115 E -2  B Knee strength  F -4   E -4      Flowsheet Rows      Most Recent Value   PT/OT G-Codes   FOTO information reviewed  N/A   Assessment Type  Re-evaluation   G code set  Mobility: Walking & Moving Around   Mobility: Walking and Moving Around Current Status ()  CI   Mobility: Walking and Moving Around Goal Status ()  [facility]          Manual  7/27  8/2  8/3  8/6  8/8  8/14           RUST 12  --->  -->    10              Stretch      57  47  19  19                 Exercise Diary  7/27 8/2  8/3 8/6  8/8              Robbery Ex 1/10 5" 1/10  5"   1/10  5" 1/10  5"  1/10  5"              LF back extension 25# 2/15  25#  2/15  25#  2/15  --->  xxx                                                             Core Stability                       Pelvic tilt    20  30 40  40             T-band abd    L3  2/10  L4  2/10  L4  3/10  L4  3/10             Supine rotation   2/20  1 5#  2/20  2#  2/20  2#  2/20                                      Bike           5min LV1

## 2018-08-17 ENCOUNTER — APPOINTMENT (OUTPATIENT)
Dept: PHYSICAL THERAPY | Facility: CLINIC | Age: 68
End: 2018-08-17
Payer: MEDICARE

## 2018-08-20 ENCOUNTER — APPOINTMENT (OUTPATIENT)
Dept: PHYSICAL THERAPY | Facility: CLINIC | Age: 68
End: 2018-08-20
Payer: MEDICARE

## 2018-08-22 ENCOUNTER — OFFICE VISIT (OUTPATIENT)
Dept: PHYSICAL THERAPY | Facility: CLINIC | Age: 68
End: 2018-08-22
Payer: MEDICARE

## 2018-08-22 DIAGNOSIS — M51.36 DEGENERATION OF LUMBAR INTERVERTEBRAL DISC: ICD-10-CM

## 2018-08-22 DIAGNOSIS — M25.551 RIGHT HIP PAIN: ICD-10-CM

## 2018-08-22 DIAGNOSIS — M48.061 LUMBAR FORAMINAL STENOSIS: Primary | ICD-10-CM

## 2018-08-22 PROCEDURE — 97014 ELECTRIC STIMULATION THERAPY: CPT

## 2018-08-22 PROCEDURE — 97140 MANUAL THERAPY 1/> REGIONS: CPT

## 2018-08-22 PROCEDURE — 97110 THERAPEUTIC EXERCISES: CPT

## 2018-08-22 NOTE — PROGRESS NOTES
Daily Note     Today's date: 2018  Patient name: Maria Isabel Davis  : 1950  MRN: [id number]  Referring provider: Oswaldo Carmona MD  Dx:   Encounter Diagnosis     ICD-10-CM    1  Lumbar foraminal stenosis M99 83    2  Degeneration of lumbar intervertebral disc M51 36    3  Right hip pain M25 551        Start Time: 1430  Stop Time: 1540  Total time in clinic (min): 70 minutes    Subjective: Pt comments on stiffness B hips  Objective: See treatment diary below  Manual  7/27  8/2  8/3  8/6  8/8  8/14 8/22          STM 12  --->  -->    10   10           Stretch      12  12  12  12  12               Exercise Diary  7/27  8/2  8/3 8/6  8/8  8/22            Robbery Ex 1/10 5" 1/10  5"   1/10  5" 1/10  5"  1/10  5"  1/10  5"            LF back extension 25# 2/15  25#  2/15  25#  2/15  --->  xxx  xxx                                                           Core Stability                       Pelvic tilt    20  30 40  40  40           T-band abd    L3  2/10  L4  2/10  L4  3/10  L4  3/10  L4  3/10           Supine rotation   /20  1 5#  2/20  2#  2/  2#  2/  2#  2/                                    Bike           5min LV1 10m  L1                                                                                              Assessment: Tolerated treatment well  Patient exhibited good technique with therapeutic exercises  Less stiffness noted after tx  Plan: Continue per plan of care

## 2018-08-23 ENCOUNTER — OFFICE VISIT (OUTPATIENT)
Dept: PHYSICAL THERAPY | Facility: CLINIC | Age: 68
End: 2018-08-23
Payer: MEDICARE

## 2018-08-23 DIAGNOSIS — M48.061 LUMBAR FORAMINAL STENOSIS: Primary | ICD-10-CM

## 2018-08-23 DIAGNOSIS — M51.36 DEGENERATION OF LUMBAR INTERVERTEBRAL DISC: ICD-10-CM

## 2018-08-23 DIAGNOSIS — M25.551 RIGHT HIP PAIN: ICD-10-CM

## 2018-08-23 PROCEDURE — 97110 THERAPEUTIC EXERCISES: CPT

## 2018-08-23 PROCEDURE — 97140 MANUAL THERAPY 1/> REGIONS: CPT

## 2018-08-23 NOTE — PROGRESS NOTES
Daily Note     Today's date: 2018  Patient name: Michael Arredondo  : 1950  MRN: [id number]  Referring provider: Iva Jon MD  Dx:   Encounter Diagnosis     ICD-10-CM    1  Lumbar foraminal stenosis M99 83    2  Degeneration of lumbar intervertebral disc M51 36    3  Right hip pain M25 551        Start Time: 0930  Stop Time: 1015  Total time in clinic (min): 45 minutes    Subjective: Pt reports slight hip soreness last PM, she had min c/o today  Objective: See treatment diary below  Manual  7/27  8/2  8/3  8/6  8/8  8/14 8/22  8/23        STM 12  --->  -->    10   10  10         Stretch      12  12  12  12  12  12             Exercise Diary  7/27  8/2  8/3 8/6  8/8  8/22  8/23          Robbery Ex 1/10 5" 1/10  5"   1/10  5" 1/10  5"  1/10  5"  1/10  5"  1/10  5"          LF back extension 25# 2/15  25#  2/15  25#  2/15  --->  xxx  xxx  xx                                                         Core Stability                       Pelvic tilt    20  30 40  40  40  40         T-band abd    L3  2/10  L4  2/10  L4  3/10  L4  3/10  L4  3/10  L4  3/10         Supine rotation     1 5#    2#    2#    2#    2#                                    Bike           5min LV1 10m  L1  10m  L1                                                                                           Assessment: Tolerated treatment well  Patient exhibited good technique with therapeutic exercises      Plan: Continue per plan of care

## 2018-08-27 ENCOUNTER — OFFICE VISIT (OUTPATIENT)
Dept: PHYSICAL THERAPY | Facility: CLINIC | Age: 68
End: 2018-08-27
Payer: MEDICARE

## 2018-08-27 DIAGNOSIS — M25.551 RIGHT HIP PAIN: ICD-10-CM

## 2018-08-27 DIAGNOSIS — M51.36 DEGENERATION OF LUMBAR INTERVERTEBRAL DISC: ICD-10-CM

## 2018-08-27 DIAGNOSIS — M48.061 LUMBAR FORAMINAL STENOSIS: Primary | ICD-10-CM

## 2018-08-27 PROCEDURE — 97014 ELECTRIC STIMULATION THERAPY: CPT

## 2018-08-27 PROCEDURE — 97140 MANUAL THERAPY 1/> REGIONS: CPT

## 2018-08-27 PROCEDURE — 97110 THERAPEUTIC EXERCISES: CPT

## 2018-08-27 NOTE — PROGRESS NOTES
Daily Note     Today's date: 2018  Patient name: Naye Stone  : 1950  MRN: [id number]  Referring provider: Jerzy Hubbard MD  Dx:   Encounter Diagnosis     ICD-10-CM    1  Lumbar foraminal stenosis M99 83    2  Degeneration of lumbar intervertebral disc M51 36    3  Right hip pain M25 551        Start Time: 1430  Stop Time: 1540  Total time in clinic (min): 70 minutes    Subjective: Pt  reports increased lumbar discomfort after increased dancing this weekend  Objective: See treatment diary below  Manual  7/27  8/2  8/3  8/6  8/8  8/14 8/22  8/23  8/27      STM 12  --->  -->    10   10  10  10       Stretch      12  12  12  12  12  12  12           Exercise Diary  7/27  8/2  8/3 8/6  8/8  8/22  8/23  8/27        Robbery Ex 1/10 5" 1/10  5"   1/10  5" 1/10  5"  1/10  5"  1/10  5"  1/10  5"  home        LF back extension 25# 2/15  25#  2/15  25#  2/15  --->  xxx  xxx  xx  xxx                                                       Core Stability                       Pelvic tilt    20  30 40  40  40  40  40       T-band abd    L3  2/10  L4  2/10  L4  3/10  L4  3/10  L4  3/10  L4  3/10  L4  3/10       Supine rotation   2/20  1 5#  2/20  2#  2/20  2#  2/20  2#  2/20  2#  2/20  2#  2/                                Bike           5min LV1 10m  L1  10m  L1  12m  L1                                                                                      Assessment: Tolerated treatment well  Patient exhibited good technique with therapeutic exercises  Less discomfort after tx  Plan: Continue per plan of care

## 2018-08-28 ENCOUNTER — APPOINTMENT (OUTPATIENT)
Dept: PHYSICAL THERAPY | Facility: CLINIC | Age: 68
End: 2018-08-28
Payer: MEDICARE

## 2018-08-29 ENCOUNTER — OFFICE VISIT (OUTPATIENT)
Dept: PHYSICAL THERAPY | Facility: CLINIC | Age: 68
End: 2018-08-29
Payer: MEDICARE

## 2018-08-29 DIAGNOSIS — M51.36 DEGENERATION OF LUMBAR INTERVERTEBRAL DISC: ICD-10-CM

## 2018-08-29 DIAGNOSIS — M25.551 RIGHT HIP PAIN: ICD-10-CM

## 2018-08-29 DIAGNOSIS — M48.061 LUMBAR FORAMINAL STENOSIS: Primary | ICD-10-CM

## 2018-08-29 PROCEDURE — 97014 ELECTRIC STIMULATION THERAPY: CPT | Performed by: PHYSICAL THERAPIST

## 2018-08-29 PROCEDURE — 97140 MANUAL THERAPY 1/> REGIONS: CPT | Performed by: PHYSICAL THERAPIST

## 2018-08-29 PROCEDURE — 97110 THERAPEUTIC EXERCISES: CPT | Performed by: PHYSICAL THERAPIST

## 2018-08-31 ENCOUNTER — APPOINTMENT (OUTPATIENT)
Dept: PHYSICAL THERAPY | Facility: CLINIC | Age: 68
End: 2018-08-31
Payer: MEDICARE

## 2018-09-04 ENCOUNTER — APPOINTMENT (OUTPATIENT)
Dept: PHYSICAL THERAPY | Facility: CLINIC | Age: 68
End: 2018-09-04
Payer: MEDICARE

## 2018-09-05 ENCOUNTER — OFFICE VISIT (OUTPATIENT)
Dept: PHYSICAL THERAPY | Facility: CLINIC | Age: 68
End: 2018-09-05
Payer: MEDICARE

## 2018-09-05 ENCOUNTER — TRANSCRIBE ORDERS (OUTPATIENT)
Dept: PHYSICAL THERAPY | Facility: CLINIC | Age: 68
End: 2018-09-05

## 2018-09-05 DIAGNOSIS — M48.061 LUMBAR FORAMINAL STENOSIS: Primary | ICD-10-CM

## 2018-09-05 DIAGNOSIS — M25.551 RIGHT HIP PAIN: ICD-10-CM

## 2018-09-05 DIAGNOSIS — M51.36 DEGENERATION OF LUMBAR INTERVERTEBRAL DISC: ICD-10-CM

## 2018-09-05 PROCEDURE — G8979 MOBILITY GOAL STATUS: HCPCS | Performed by: PHYSICAL THERAPIST

## 2018-09-05 PROCEDURE — G8978 MOBILITY CURRENT STATUS: HCPCS | Performed by: PHYSICAL THERAPIST

## 2018-09-05 PROCEDURE — 97110 THERAPEUTIC EXERCISES: CPT | Performed by: PHYSICAL THERAPIST

## 2018-09-05 NOTE — PROGRESS NOTES
PT Re-Evaluation     Today's date: 2018  Patient name: Tena Barker  : 1950  MRN: [id number]  Referring provider: Hadley Moon MD  Dx:   Encounter Diagnosis     ICD-10-CM    1  Lumbar foraminal stenosis M99 83    2  Degeneration of lumbar intervertebral disc M51 36    3  Right hip pain M25 551        Start Time: 1000  Stop Time: 1050  Total time in clinic (min): 50 minutes    Assessment    Assessment details:  Tena Barker 79 y o  female with a recent exacerbation of chronic lower back pain related to degenerative changes in her lumbar spine is responding well to exercise program  The patient has been seen 14  times in outpatient physical therapy  The patient notes further  reduction in lumbar pain, and  Slight improvement in standing and walking tolerance  Pain remains  2/10; at worse with when rising after extended sitting  and with standing and walking ,   The patient has weaned from Gabapentin per recommendations of her family physician  Lumbar ROM F 55/60 E 12/15 RSB 35/40 LSB 35/40 Hamstring flexibility is good  Both  Hip AROM: F= 100    ABD= 40  EXT= 10 ER= 35  IR=  neutral  Hip Strength: F + 4  Abd  4  E +4  R knee ROM F 115 E 0  L knee ROM F 115 E -2  B Knee strength  F 4   E 4  Some improvement in left  VMO integrity and  quad set  Some improvement  in core stability and lumbar and scapular extensors  Patient is ambulating without device  Gait pattern is compromised with initial standing after sitting, as noted  , but becomes further compromised with extended ambulation  Standing tolerance 10 min  Walking tolerance 15 min  Functional: less difficulty include steps and getting in and out of car, soft or low seats    Barriers to therapy: general OA; multiple joint replacements (both hips and right shoulder); DJD both knees; obesity lumbar DDD and stenosis     STG  Reduce lumbar  Pain during weight bearing by 50% 2-3 weeks - Achieved  Increase strength in core stability and scopula stabilizers   3-4 weeks - Ongoing  LTG  Improve gait pattern and ambulation tolerance  Promote back care management through appropriate exercise  Establish effective HEP  Resume normal functional activities       Plan  Continue 2  times per week 3-4 weeks  Transition to wellness  LE stretching  TE - core stability, LE strengthening  general fitness            Subjective Patient notes overall improvement since restarting PT    Objective     Lumbar ROM F 55/60 E 12/15 RSB 35/40 LSB 35/40 Hamstring flexibility is good     Both  Hip AROM: F= 100    ABD= 40  EXT= 10 ER= 35  IR=  neutral  Hip Strength: F + 4  Abd  4  E +4  R knee ROM F 115 E 0  L knee ROM F 115 E -2  B Knee strength  F 4   E 4  Some improvement in left  VMO integrity and  quad set  Flowsheet Rows      Most Recent Value   PT/OT G-Codes   Assessment Type  Re-evaluation   G code set  Mobility: Walking & Moving Around   Mobility: Walking and Moving Around Current Status ()  CJ   Mobility: Walking and Moving Around Goal Status ()  CI          Manual  9/5  8/2  8/3  8/6  8/8  8/14 8/22  8/23  8/27      STM 12  --->  -->    10   10  10  10       Stretch      12  12  12  12  12  12  12           Exercise Diary  9/5  8/2  8/3 8/6  8/8  8/22  8/23  8/27        Robbery Ex home 1/10  5"   1/10  5" 1/10  5"  1/10  5"  1/10  5"  1/10  5"  home        LF back extension xxxx  25#  2/15  25#  2/15  --->  xxx  xxx  xx  xxx                                                       Core Stability                       Pelvic tilt  40  20  30 40  40  40  40  40       T-band abd  L4 3/10  L3  2/10  L4  2/10  L4  3/10  L4  3/10  L4  3/10  L4  3/10  L4  3/10       Supine rotation  #6 2/20 2/20  1 5#  2/20  2#  2/20  2#  2/20  2#  2/20  2#  2/20  2#  2/20        SAQ  7# 3/10                      Bike   12m LV2        5min LV1 10m  L1  10m  L1  12m  L1

## 2018-09-05 NOTE — LETTER
2018    Amee Soto MD  [address] [address] [address] [place] [address]    Patient: Ilana Mansfield   YOB: 1950   Date of Visit: 2018     Encounter Diagnosis     ICD-10-CM    1  Lumbar foraminal stenosis M99 83    2  Degeneration of lumbar intervertebral disc M51 36    3  Right hip pain M25 551        Dear Dr Mihir Wall:    Please review the attached Plan of Care from [facility] recent visit  Please verify that you agree therapy should continue by signing the attached document and sending it back to our office  If you have any questions or concerns, please don't hesitate to call  Sincerely,    Reshma Andujar, PT      Referring Provider:      I certify that I have read the below Plan of Care and certify the need for these services furnished under this plan of treatment while under my care  Amee Soto MD  [address]: [phone]          PT Re-Evaluation     Today's date: 2018  Patient name: Ilana Mansfield  : 1950  MRN: [id number]  Referring provider: Adair Perkins MD  Dx:   Encounter Diagnosis     ICD-10-CM    1  Lumbar foraminal stenosis M99 83    2  Degeneration of lumbar intervertebral disc M51 36    3  Right hip pain M25 551        Start Time: 1000  Stop Time: 1050  Total time in clinic (min): 50 minutes    Assessment    Assessment details:  Ilana Mansfield 79 y o  female with a recent exacerbation of chronic lower back pain related to degenerative changes in her lumbar spine is responding well to exercise program  The patient has been seen 14  times in outpatient physical therapy  The patient notes further  reduction in lumbar pain, and  Slight improvement in standing and walking tolerance  Pain remains  2/10; at worse with when rising after extended sitting  and with standing and walking ,   The patient has weaned from Gabapentin per recommendations of her family physician  Lumbar ROM F 55/60 E 12/15 RSB 35/40 LSB 35/40 Hamstring flexibility is good  Both  Hip AROM: F= 100    ABD= 40  EXT= 10 ER= 35  IR=  neutral  Hip Strength: F + 4  Abd  4  E +4  R knee ROM F 115 E 0  L knee ROM F 115 E -2  B Knee strength  F 4   E 4  Some improvement in left  VMO integrity and  quad set  Some improvement  in core stability and lumbar and scapular extensors  Patient is ambulating without device  Gait pattern is compromised with initial standing after sitting, as noted  , but becomes further compromised with extended ambulation  Standing tolerance 10 min  Walking tolerance 15 min  Functional: less difficulty include steps and getting in and out of car, soft or low seats    Barriers to therapy: general OA; multiple joint replacements (both hips and right shoulder); DJD both knees; obesity lumbar DDD and stenosis     STG  Reduce lumbar  Pain during weight bearing by 50% 2-3 weeks - Achieved  Increase strength in core stability and scopula  stabilizers   3-4 weeks - Ongoing  LTG  Improve gait pattern and ambulation tolerance  Promote back care management through appropriate exercise  Establish effective HEP  Resume normal functional activities       Plan  Continue 2  times per week 3-4 weeks  Transition to wellness  LE stretching  TE - core stability, LE strengthening  general fitness            Subjective Patient notes overall improvement since restarting PT    Objective     Lumbar ROM F 55/60 E 12/15 RSB 35/40 LSB 35/40 Hamstring flexibility is good     Both  Hip AROM: F= 100    ABD= 40  EXT= 10 ER= 35  IR=  neutral  Hip Strength: F + 4  Abd  4  E +4  R knee ROM F 115 E 0  L knee ROM F 115 E -2  B Knee strength  F 4   E 4  Some improvement in left  VMO integrity and  quad set  Flowsheet Rows      Most Recent Value   PT/OT G-Codes   Assessment Type  Re-evaluation   G code set  Mobility: Walking & Moving Around   Mobility: Walking and Moving Around Current Status ()  CJ   Mobility: Walking and Moving Around Goal Status ()  CI          Manual  9/5  8/2  8/3  8/6  8/8  8/14 8/22  8/23  8/27      STM 12  --->  -->    10   10  10  10       Stretch      12  12  12  12  12  12  12           Exercise Diary  9/5  8/2  8/3 8/6  8/8  8/22  8/23  8/27        Robbery Ex home 1/10  5"   1/10  5" 1/10  5"  1/10  5"  1/10  5"  1/10  5"  home        LF back extension xxxx  25#  2/15  25#  2/15  --->  xxx  xxx  xx  xxx                                                       Core Stability                       Pelvic tilt  40  20  30 40  40  40  40  40       T-band abd  L4 3/10  L3  2/10  L4  2/10  L4  3/10  L4  3/10  L4  3/10  L4  3/10  L4  3/10       Supine rotation  #6 2/20 2/20  1 5#  2/20  2#  2/20  2#  2/20  2#  2/20  2#  2/20  2#  2/20        SAQ  7# 3/10                      Bike   12m LV2        5min LV1 10m  L1  10m  L1  12m  L1

## 2018-09-06 ENCOUNTER — APPOINTMENT (OUTPATIENT)
Dept: PHYSICAL THERAPY | Facility: CLINIC | Age: 68
End: 2018-09-06
Payer: MEDICARE

## 2018-09-07 ENCOUNTER — OFFICE VISIT (OUTPATIENT)
Dept: PHYSICAL THERAPY | Facility: CLINIC | Age: 68
End: 2018-09-07
Payer: MEDICARE

## 2018-09-07 DIAGNOSIS — M25.551 RIGHT HIP PAIN: ICD-10-CM

## 2018-09-07 DIAGNOSIS — M51.36 DEGENERATION OF LUMBAR INTERVERTEBRAL DISC: ICD-10-CM

## 2018-09-07 DIAGNOSIS — M48.061 LUMBAR FORAMINAL STENOSIS: Primary | ICD-10-CM

## 2018-09-07 PROCEDURE — 97110 THERAPEUTIC EXERCISES: CPT | Performed by: PHYSICAL THERAPIST

## 2018-09-07 PROCEDURE — 97140 MANUAL THERAPY 1/> REGIONS: CPT | Performed by: PHYSICAL THERAPIST

## 2018-09-07 NOTE — PROGRESS NOTES
Daily Note     Today's date: 2018  Patient name: Dequan Lawson  : 1950  MRN: [id number]  Referring provider: Rodriguez Kimbrough MD  Dx:   Encounter Diagnosis     ICD-10-CM    1  Lumbar foraminal stenosis M99 83    2  Degeneration of lumbar intervertebral disc M51 36    3  Right hip pain M25 551        Start Time: 1045  Stop Time: 1137  Total time in clinic (min): 52 minutes    Subjective: Patient reports "feeling better"   Patient has tolerated all exercises  Objective: PT performed LE stretching, and directed core stability and LE strengtheing    Manual  9/5  9/7  8/3  8/6  8/8  8/14 8/22  8/23  8/27      STM   --->  -->    10   10  10  10       Stretch  12  12  12  12  12  12  12  12  12           Exercise Diary  9/5  9/7  8/3 8/6  8/8  8/22  8/23  8/27        Robbery Ex home xxx    1/10  5" 1/10  5"  10  5"  1/10  5"  1/10  5"  home        LF back extension xxxx  xxx    25#  2/15  --->  xxx  xxx  xx  xxx                                                       Core Stability                       Pelvic tilt  40  20  30 40  40  40  40  40       T-band abd  L4 3/10  L4  3/10  L4  2/10  L4  3/10  L4  3/10  L4  3/10  L4  3/10  L4  3/10       Supine rotation  #6 2/20 2/20  1 5#  2/20  2#  2/20  2#  2/20  2#  2/20  2#  2/20  2#  2/20        SAQ  7# 3/10  7# 3/10                    Bike   12m LV2  15m LV2      5min LV1 10m  L1  10m  L1  12m  L1                                                                                              Assessment: Tolerated treatment well  Patient is progressing      Plan: Continue per plan of care   Prepare for transition to wellness

## 2018-09-11 ENCOUNTER — APPOINTMENT (OUTPATIENT)
Dept: PHYSICAL THERAPY | Facility: CLINIC | Age: 68
End: 2018-09-11
Payer: MEDICARE

## 2018-09-12 ENCOUNTER — OFFICE VISIT (OUTPATIENT)
Dept: PHYSICAL THERAPY | Facility: CLINIC | Age: 68
End: 2018-09-12
Payer: MEDICARE

## 2018-09-12 DIAGNOSIS — M25.551 RIGHT HIP PAIN: ICD-10-CM

## 2018-09-12 DIAGNOSIS — M48.061 LUMBAR FORAMINAL STENOSIS: Primary | ICD-10-CM

## 2018-09-12 DIAGNOSIS — M51.36 DEGENERATION OF LUMBAR INTERVERTEBRAL DISC: ICD-10-CM

## 2018-09-12 PROCEDURE — 97140 MANUAL THERAPY 1/> REGIONS: CPT | Performed by: PHYSICAL THERAPIST

## 2018-09-12 PROCEDURE — 97110 THERAPEUTIC EXERCISES: CPT | Performed by: PHYSICAL THERAPIST

## 2018-09-12 NOTE — PROGRESS NOTES
Daily Note     Today's date: 2018  Patient name: Sandhya Davis  : 1950  MRN: [id number]  Referring provider: Edin Hunter MD  Dx:   Encounter Diagnosis     ICD-10-CM    1  Lumbar foraminal stenosis M99 83    2  Degeneration of lumbar intervertebral disc M51 36    3  Right hip pain M25 551        Start Time: 1000  Stop Time: 1057  Total time in clinic (min): 57 minutes    Subjective: Patient notes overall improvement  She feels that the LE stretching "has helped the most "      Objective: PT repeated LE stretching  Increased repetitions in her exercises  Manual        STM    --->  -->    10   10  10  10       Stretch  12  12  12  12  12  12  12    12           Exercise Diary  9/5  9/7  8/3 8/6  8/8  8/22  8/23  8/27        Robbery Ex home xxx    xxxx /10  5"  1/10  5"  1/10  5"  1/10  5"  home        LF back extension xxxx  xxx     xxxx  --->  xxx  xxx  xx  xxx                                                       Core Stability                       Pelvic tilt  40  20  40 40  40  40  40  40       T-band abd  L4 3/10  L4  3/10  L5  4 /10  L4  3/10  L4  3/10  L4  3/10  L4  3/10  L4  3/10       Supine rotation  #6 2/20 2/20  7#  2/20  2#  2/20  2#  2/20  2#  2/20  2#  2/20  2#  2/20        SAQ  7# 3/10  7# 3/10  7 #  4/10                  Bike   12m LV2  15m LV2  15m lV2    5min LV1 10m  L1  10m  L1  12m  L1                                                                                              Assessment: Tolerated treatment well         Plan: Continue 1-2 weeks; transition to wellness

## 2018-09-17 ENCOUNTER — APPOINTMENT (OUTPATIENT)
Dept: PHYSICAL THERAPY | Facility: CLINIC | Age: 68
End: 2018-09-17
Payer: MEDICARE

## 2018-09-19 ENCOUNTER — APPOINTMENT (OUTPATIENT)
Dept: PHYSICAL THERAPY | Facility: CLINIC | Age: 68
End: 2018-09-19
Payer: MEDICARE

## 2018-09-24 ENCOUNTER — OFFICE VISIT (OUTPATIENT)
Dept: PHYSICAL THERAPY | Facility: CLINIC | Age: 68
End: 2018-09-24
Payer: MEDICARE

## 2018-09-24 DIAGNOSIS — M25.551 RIGHT HIP PAIN: ICD-10-CM

## 2018-09-24 DIAGNOSIS — M48.061 LUMBAR FORAMINAL STENOSIS: Primary | ICD-10-CM

## 2018-09-24 DIAGNOSIS — M51.36 DEGENERATION OF LUMBAR INTERVERTEBRAL DISC: ICD-10-CM

## 2018-09-24 PROCEDURE — 97140 MANUAL THERAPY 1/> REGIONS: CPT | Performed by: PHYSICAL THERAPIST

## 2018-09-24 PROCEDURE — 97110 THERAPEUTIC EXERCISES: CPT | Performed by: PHYSICAL THERAPIST

## 2018-09-24 NOTE — PROGRESS NOTES
Daily Note     Today's date: 2018  Patient name: Dequan Lawson  : 1950  MRN: [id number]  Referring provider: Rodriguez Kimbrough MD  Dx:   Encounter Diagnosis     ICD-10-CM    1  Lumbar foraminal stenosis M99 83    2  Degeneration of lumbar intervertebral disc M51 36    3  Right hip pain M25 551        Start Time: 1400  Stop Time: 1440  Total time in clinic (min): 40 minutes    Subjective: Patient is scheduled for an upper lumbar lower thoracic high frequency radio wave ablasion next week (Dr Obie Dalal)    Objective: PT performed LE stretching and directed core stability exercises and general LE strengthening     Manual        STM    --->  -->    10   10  10  10       Stretch  12  12  12  12  12  12  12  12  12           Exercise Diary          Robbery Ex home xxx    xxxx /10  5"  1/10  5"  10  5"  1/10  5"  home        LF back extension xxxx  xxx     xxxx  --->  xxx  xxx  xx  xxx                                                       Core Stability                       Pelvic tilt  40  20  40 40  40  40  40  40       T-band abd  L4 3/10  L4  3/10  L5  4 /10  L4  3/10  L4  3/10  L4  3/10  L4  3/10  L4  3/10       Supine rotation  #6 2/20 2/20  7#  2/20  2#  2/20  2#  2/20  2#  2/20  2#  2/20  2#  2/20        SAQ  7# 3/10  7# 3/10  7 #  4/10                  Bike   12m LV2  15m LV2  20m lV2    5min LV1 10m  L1  10m  L1  12m  L1                                                                                         Assessment: Tolerated treatment well         Plan: Continue 2x per week 2 weeks transition to Wellness program

## 2019-02-20 ENCOUNTER — TRANSCRIBE ORDERS (OUTPATIENT)
Dept: PHYSICAL THERAPY | Facility: CLINIC | Age: 69
End: 2019-02-20

## 2019-02-20 ENCOUNTER — EVALUATION (OUTPATIENT)
Dept: PHYSICAL THERAPY | Facility: CLINIC | Age: 69
End: 2019-02-20
Payer: MEDICARE

## 2019-02-20 DIAGNOSIS — M51.36 DEGENERATION OF LUMBAR INTERVERTEBRAL DISC: ICD-10-CM

## 2019-02-20 DIAGNOSIS — M48.061 LUMBAR FORAMINAL STENOSIS: Primary | ICD-10-CM

## 2019-02-20 PROCEDURE — 97110 THERAPEUTIC EXERCISES: CPT | Performed by: PHYSICAL THERAPIST

## 2019-02-20 PROCEDURE — 97162 PT EVAL MOD COMPLEX 30 MIN: CPT | Performed by: PHYSICAL THERAPIST

## 2019-02-20 PROCEDURE — 97140 MANUAL THERAPY 1/> REGIONS: CPT | Performed by: PHYSICAL THERAPIST

## 2019-02-20 NOTE — LETTER
2019    Lily Faustin MD  [address] [facility] [address] [place] [id number]    Patient: Paulette Weinberg   YOB: 1950   Date of Visit: 2019     Encounter Diagnosis     ICD-10-CM    1  Lumbar foraminal stenosis M99 83    2  Degeneration of lumbar intervertebral disc M51 36        Dear Dr Terrence Henson:    Please review the attached Plan of Care from [facility] recent visit  Please verify that you agree therapy should continue by signing the attached document and sending it back to our office  If you have any questions or concerns, please don't hesitate to call  Sincerely,    Erick Cummins, PT      Referring Provider:      I certify that I have read the below Plan of Care and certify the need for these services furnished under this plan of treatment while under my care  Lily Faustin MD  [address]  VIA Facsimile: [phone]          PT Evaluation     Today's date: 2019  Patient name: Paulette Weinberg  : 1950  MRN: [id number]  Referring provider: Romi Saunders MD  Dx:   Encounter Diagnosis     ICD-10-CM    1  Lumbar foraminal stenosis M99 83    2  Degeneration of lumbar intervertebral disc M51 36        Start Time: 0900  Stop Time: 1020  Total time in clinic (min): 80 minutes    Assessment  Assessment details: Paulette Weinberg 76 y o  female with a recent exacerbation of chronic lower back pain related to degenerative changes in her lumbar spine  The patient did receive an injection to her SI joints, and this has significantly reduced her symptoms  Lumbar pain is episodic and ephemeral:  rated 4/10; at worse with when rising after extended sitting, including getting out of her car  Lumbar ROM F 55/60 E 10/15 RSB 30/40 LSB 35/40 Hamstring flexibility is good     Both  Hip AROM: F= 98    ABD= 40  EXT= 10 ER= 30  IR=  neutral  Hip Strength: F + 4  Abd  4  E +4  R knee ROM F 115 E 0  L knee ROM F 115 E -2  B Knee strength  F -4   E -4  Weakness in VMO; weaker left quad set  Weakness in core stability and lumbar and scapular extensors  Patient is ambulating without device  Gait pattern is compromised with initial standing after sitting, as noted, but this has improved since her SI joint injection  Standing tolerance 12 min  Walking tolerance 18 min  Functional:  So difficulty include steps and getting in and out of car, soft or low seats      STG  Reduce lumbar  Pain during weight bearing by 50% 2-3 weeks - Achieved  Increase strength in core stability and scapula  stabilizers   3-4 weeks  LTG  Improve gait pattern and ambulation tolerance  Promote back care management through appropriate exercise  Establish effective HEP  Resume normal functional activities       Plan   2-3 times per week 4 weeks  LE stretching  TE - core stability, LE strengthening  general fitness include HEP          Barriers to therapy: Barriers to therapy: general OA; multiple joint replacements (both hips and right shoulder); DJD both knees; obesity lumbar DDD and stenosis           Subjective Patient has experienced a recent exacerbation of lumbosacral pain  Objective     Lumbar ROM F 55/60 E 10/15 RSB 30/40 LSB 35/40 Hamstring flexibility is good     Both  Hip AROM: F= 98    ABD= 40  EXT= 10 ER= 30  IR=  neutral  Hip Strength: F + 4  Abd  4  E +4  R knee ROM F 115 E 0  L knee ROM F 115 E -2  B Knee strength  F -4   E -4  Weakness in VMO; weaker left quad set  Weakness in core stability and lumbar and scapular extensors        Manual  2/20       LE stretching TK                   Exercise Diary  2/20       Pelvic tilts 30 10'       Supine March 3/10       Supine Rotation 3/10                                               BIKE 25 min LV1

## 2019-02-20 NOTE — PROGRESS NOTES
PT Evaluation     Today's date: 2019  Patient name: Demarcus Oliveira  : 1950  MRN: [id number]  Referring provider: Taiwo Carvalho MD  Dx:   Encounter Diagnosis     ICD-10-CM    1  Lumbar foraminal stenosis M99 83    2  Degeneration of lumbar intervertebral disc M51 36        Start Time: 0900  Stop Time: 1020  Total time in clinic (min): 80 minutes    Assessment  Assessment details: Demarcus Oliveira 76 y o  female with a recent exacerbation of chronic lower back pain related to degenerative changes in her lumbar spine  The patient did receive an injection to her SI joints, and this has significantly reduced her symptoms  Lumbar pain is episodic and ephemeral:  rated 4/10; at worse with when rising after extended sitting, including getting out of her car  Lumbar ROM F 55/60 E 10/15 RSB 30/40 LSB 35/40 Hamstring flexibility is good  Both  Hip AROM: F= 98    ABD= 40  EXT= 10 ER= 30  IR=  neutral  Hip Strength: F + 4  Abd  4  E +4  R knee ROM F 115 E 0  L knee ROM F 115 E -2  B Knee strength  F -4   E -4  Weakness in VMO; weaker left quad set  Weakness in core stability and lumbar and scapular extensors  Patient is ambulating without device  Gait pattern is compromised with initial standing after sitting, as noted, but this has improved since her SI joint injection    Standing tolerance 12 min  Walking tolerance 18 min  Functional:  So difficulty include steps and getting in and out of car, soft or low seats      STG  Reduce lumbar  Pain during weight bearing by 50% 2-3 weeks - Achieved  Increase strength in core stability and scapula  stabilizers   3-4 weeks  LTG  Improve gait pattern and ambulation tolerance  Promote back care management through appropriate exercise  Establish effective HEP  Resume normal functional activities       Plan   2-3 times per week 4 weeks  LE stretching  TE - core stability, LE strengthening  general fitness include HEP          Barriers to therapy: Barriers to therapy: general OA; multiple joint replacements (both hips and right shoulder); DJD both knees; obesity lumbar DDD and stenosis           Subjective Patient has experienced a recent exacerbation of lumbosacral pain  Objective     Lumbar ROM F 55/60 E 10/15 RSB 30/40 LSB 35/40 Hamstring flexibility is good     Both  Hip AROM: F= 98    ABD= 40  EXT= 10 ER= 30  IR=  neutral  Hip Strength: F + 4  Abd  4  E +4  R knee ROM F 115 E 0  L knee ROM F 115 E -2  B Knee strength  F -4   E -4  Weakness in VMO; weaker left quad set  Weakness in core stability and lumbar and scapular extensors        Manual  2/20       LE stretching TK                   Exercise Diary  2/20       Pelvic tilts 30 10'       Supine March 3/10       Supine Rotation 3/10                                               BIKE 25 min LV1

## 2019-02-21 ENCOUNTER — OFFICE VISIT (OUTPATIENT)
Dept: PHYSICAL THERAPY | Facility: CLINIC | Age: 69
End: 2019-02-21
Payer: MEDICARE

## 2019-02-21 DIAGNOSIS — M51.36 DEGENERATION OF LUMBAR INTERVERTEBRAL DISC: ICD-10-CM

## 2019-02-21 DIAGNOSIS — M48.061 LUMBAR FORAMINAL STENOSIS: Primary | ICD-10-CM

## 2019-02-21 PROCEDURE — 97110 THERAPEUTIC EXERCISES: CPT | Performed by: PHYSICAL THERAPIST

## 2019-02-21 PROCEDURE — 97140 MANUAL THERAPY 1/> REGIONS: CPT | Performed by: PHYSICAL THERAPIST

## 2019-02-21 NOTE — PROGRESS NOTES
Daily Note     Today's date: 2019  Patient name: Geeta Mesa  : 1950  MRN: [id number]  Referring provider: Yue Tolentino MD  Dx:   Encounter Diagnosis     ICD-10-CM    1  Lumbar foraminal stenosis M99 83    2  Degeneration of lumbar intervertebral disc M51 36        Start Time: 82  Stop Time: 1027  Total time in clinic (min): 62 minutes    Subjective: Patient reported some "burning" in her gluteal muscles  She has been performing her HEP  Objective: PT repeated LE stretching, and directed core stability exercises      Manual         LE stretch                    Exercise Diary         Bike  25 min LV1       Pelvic tilt 30X 10'       March 3/10       Supine rot 3/10       T-band Abd LV3 3/10               SAQ 5# 3/10                                                     Assessment: Tolerated treatment well  Plan: Continue per plan of care

## 2019-02-22 ENCOUNTER — APPOINTMENT (OUTPATIENT)
Dept: PHYSICAL THERAPY | Facility: CLINIC | Age: 69
End: 2019-02-22
Payer: MEDICARE

## 2019-02-25 ENCOUNTER — OFFICE VISIT (OUTPATIENT)
Dept: PHYSICAL THERAPY | Facility: CLINIC | Age: 69
End: 2019-02-25
Payer: MEDICARE

## 2019-02-25 DIAGNOSIS — M51.36 DEGENERATION OF LUMBAR INTERVERTEBRAL DISC: ICD-10-CM

## 2019-02-25 DIAGNOSIS — M48.061 LUMBAR FORAMINAL STENOSIS: Primary | ICD-10-CM

## 2019-02-25 DIAGNOSIS — M25.551 RIGHT HIP PAIN: ICD-10-CM

## 2019-02-25 PROCEDURE — 97110 THERAPEUTIC EXERCISES: CPT

## 2019-02-25 PROCEDURE — 97140 MANUAL THERAPY 1/> REGIONS: CPT

## 2019-02-25 NOTE — PROGRESS NOTES
Daily Note     Today's date: 2019  Patient name: Geeta Mesa  : 1950  MRN: [id number]  Referring provider: Yue Tolentino MD  Dx:   Encounter Diagnosis     ICD-10-CM    1  Lumbar foraminal stenosis M99 83    2  Degeneration of lumbar intervertebral disc M51 36    3  Right hip pain M25 551        Start Time: 09  Stop Time: 1012  Total time in clinic (min): 52 minutes    Subjective: Patient stated "bruised' sensation in LB over weekend followed by tingling when sitting, able to perform HEP  Objective: See treatment diary below      Assessment: Tolerated treatment well  Patient exhibited good technique with therapeutic exercises , soreness remaining in gluteal region following treat  Plan: Continue per plan of care        Manual        LE stretch  KW                  Exercise Diary        Bike  25 min LV1 25min L1       Pelvic tilt 30X 10' 30x 10"      March 3/10 3/10      Supine rot 3/10 3/10      T-band Abd LV3 3/10 L3 3/10              SAQ 5# 3/10 5# 3/10

## 2019-02-27 ENCOUNTER — OFFICE VISIT (OUTPATIENT)
Dept: PHYSICAL THERAPY | Facility: CLINIC | Age: 69
End: 2019-02-27
Payer: MEDICARE

## 2019-02-27 DIAGNOSIS — M51.36 DEGENERATION OF LUMBAR INTERVERTEBRAL DISC: ICD-10-CM

## 2019-02-27 DIAGNOSIS — M48.061 LUMBAR FORAMINAL STENOSIS: Primary | ICD-10-CM

## 2019-02-27 PROCEDURE — 97140 MANUAL THERAPY 1/> REGIONS: CPT | Performed by: PHYSICAL THERAPIST

## 2019-02-27 PROCEDURE — 97014 ELECTRIC STIMULATION THERAPY: CPT | Performed by: PHYSICAL THERAPIST

## 2019-02-27 PROCEDURE — 97110 THERAPEUTIC EXERCISES: CPT | Performed by: PHYSICAL THERAPIST

## 2019-02-27 NOTE — PROGRESS NOTES
Daily Note     Today's date: 2019  Patient name: Daryl Singh  : 1950  MRN: [id number]  Referring provider: Katty Munguia MD  Dx:   Encounter Diagnosis     ICD-10-CM    1  Lumbar foraminal stenosis M99 83    2  Degeneration of lumbar intervertebral disc M51 36        Start Time: 1600  Stop Time: 1703  Total time in clinic (min): 63 minutes    Subjective: Patient has been experiencing sharp shooting pain into bilateral buttocks, especially with initial movement when rising from seated position  Symptoms decrease with walking  Objective: PT provided IFC / HP to bilateral  L/S region        Manual         LE stretching 10       STM 5                   Exercise Diary         Pelvic tilt 30X       Supine rot 3/10        Supine march 3/10       T-band Abd 3/10                       Bike 20                                                                   Modalities        IFC HP 18                             Assessment: Tolerated treatment well  Patient had less pain post treatment      Plan: Continue per plan of care

## 2019-03-01 ENCOUNTER — OFFICE VISIT (OUTPATIENT)
Dept: PHYSICAL THERAPY | Facility: CLINIC | Age: 69
End: 2019-03-01
Payer: MEDICARE

## 2019-03-01 DIAGNOSIS — M48.061 LUMBAR FORAMINAL STENOSIS: Primary | ICD-10-CM

## 2019-03-01 DIAGNOSIS — M51.36 DEGENERATION OF LUMBAR INTERVERTEBRAL DISC: ICD-10-CM

## 2019-03-01 PROCEDURE — 97140 MANUAL THERAPY 1/> REGIONS: CPT | Performed by: PHYSICAL THERAPIST

## 2019-03-01 PROCEDURE — 97014 ELECTRIC STIMULATION THERAPY: CPT | Performed by: PHYSICAL THERAPIST

## 2019-03-01 PROCEDURE — 97110 THERAPEUTIC EXERCISES: CPT | Performed by: PHYSICAL THERAPIST

## 2019-03-01 NOTE — PROGRESS NOTES
Daily Note     Today's date: 3/1/2019  Patient name: Missy Costa  : 1950  MRN: [id number]  Referring provider: Rosana Soto MD  Dx:   Encounter Diagnosis     ICD-10-CM    1  Lumbar foraminal stenosis M99 83    2  Degeneration of lumbar intervertebral disc M51 36        Start Time: 0850  Stop Time: 1003  Total time in clinic (min): 73 minutes    Subjective: Patient spoke with Dr Jyothi SANDERS , and she recommended continue PT with emphasis on core stability and LE stretching  Patient will continue use of Ibprofen  The PA also recommended CP with IFC, rather than HP  Objective: PT performed LE stretching, and directed core strengthening exercises  Manual  2/27  3/1         LE stretching 10  10         STM 5  5                             Exercise Diary  2/27  3/1         Pelvic tilt 30X  30X         Supine rot 3/10   3/10 8#         Supine march 3/10  3/10         T-band Abd 3/10  LV4 3/10                                     Bike 20  22                                                                                                                 Modalities 2/27  3/1         IFC HP / CP 18  18  CP                                               Assessment: Tolerated treatment well  Patient did have one episode of sharp buttocks pain when getting up from exercises  PT encouraged her to tighten her core muscles when changing her positions      Plan: Continue per plan of care   Patient returns to Dr Duog Wood in 3 weeks

## 2019-03-05 ENCOUNTER — APPOINTMENT (OUTPATIENT)
Dept: PHYSICAL THERAPY | Facility: CLINIC | Age: 69
End: 2019-03-05
Payer: MEDICARE

## 2019-03-06 ENCOUNTER — OFFICE VISIT (OUTPATIENT)
Dept: PHYSICAL THERAPY | Facility: CLINIC | Age: 69
End: 2019-03-06
Payer: MEDICARE

## 2019-03-06 DIAGNOSIS — M51.36 DEGENERATION OF LUMBAR INTERVERTEBRAL DISC: ICD-10-CM

## 2019-03-06 DIAGNOSIS — M48.061 LUMBAR FORAMINAL STENOSIS: Primary | ICD-10-CM

## 2019-03-06 PROCEDURE — 97140 MANUAL THERAPY 1/> REGIONS: CPT | Performed by: PHYSICAL THERAPIST

## 2019-03-06 PROCEDURE — 97110 THERAPEUTIC EXERCISES: CPT | Performed by: PHYSICAL THERAPIST

## 2019-03-06 PROCEDURE — 97014 ELECTRIC STIMULATION THERAPY: CPT | Performed by: PHYSICAL THERAPIST

## 2019-03-06 NOTE — PROGRESS NOTES
Daily Note     Today's date: 3/6/2019  Patient name: Rose Marie Davison  : 1950  MRN: [id number]  Referring provider: Yanet Lopez MD  Dx:   Encounter Diagnosis     ICD-10-CM    1  Lumbar foraminal stenosis M99 83    2  Degeneration of lumbar intervertebral disc M51 36        Start Time: 1500  Stop Time: 1615  Total time in clinic (min): 75 minutes    Subjective:  Patient reports less frequent episodes of sharp buttocks pain  Objective: PT/PTA repeated modalities and STM  [facility] 2/27  3/1  3/6       LE stretching 10  10  10       STM 5  5  5                           Exercise Diary  2/27  3/1  3/6       Pelvic tilt 30X  30X  40X       Supine rot 3/10   3/10 8#  3/10 8#       Supine march 3/10  3/10  3/10       T-band Abd 3/10  LV4 3/10  LV4 3/10                                   Bike 20  22  22                                                                                                               Modalities 2/27  3/1  3       IFC HP / CP 18  18  CP  18/CP                                               Assessment: Tolerated treatment well  Plan: Continue per plan of care

## 2019-03-11 ENCOUNTER — OFFICE VISIT (OUTPATIENT)
Dept: PHYSICAL THERAPY | Facility: CLINIC | Age: 69
End: 2019-03-11
Payer: MEDICARE

## 2019-03-11 ENCOUNTER — TRANSCRIBE ORDERS (OUTPATIENT)
Dept: PHYSICAL THERAPY | Facility: CLINIC | Age: 69
End: 2019-03-11

## 2019-03-11 DIAGNOSIS — M51.36 DEGENERATION OF LUMBAR INTERVERTEBRAL DISC: ICD-10-CM

## 2019-03-11 DIAGNOSIS — M48.061 LUMBAR FORAMINAL STENOSIS: Primary | ICD-10-CM

## 2019-03-11 PROCEDURE — 97014 ELECTRIC STIMULATION THERAPY: CPT | Performed by: PHYSICAL THERAPIST

## 2019-03-11 PROCEDURE — 97035 APP MDLTY 1+ULTRASOUND EA 15: CPT | Performed by: PHYSICAL THERAPIST

## 2019-03-11 PROCEDURE — 97140 MANUAL THERAPY 1/> REGIONS: CPT | Performed by: PHYSICAL THERAPIST

## 2019-03-11 NOTE — PROGRESS NOTES
PT Re-Evaluation     Today's date: 3/11/2019  Patient name: Mitch Agarwal  : 1950  MRN: [id number]  Referring provider: Barrie Zhong MD  Dx:   Encounter Diagnosis     ICD-10-CM    1  Lumbar foraminal stenosis M99 83    2  Degeneration of lumbar intervertebral disc M51 36        Start Time: 1330  Stop Time: 1417  Total time in clinic (min): 47 minutes    Assessment  Assessment details: Mitch Agarwal 76 y o  female with a recent exacerbation of chronic lower back pain related to lumbar stenosis and degenerative changes in her lumbar spine  The patient did respond well to  an injection to her SI joints, but has recently experienced episodic sharp pain into  Bilateral sciatic notch, R> L,  These symptoms worsen with sitting and initial weight bearing after sitting  Rated 4 to 5 /10   Lumbar ROM F 55/60 E 12/15 RSB 35/40 LSB 35/40 Hamstring flexibility is good     Both  Hip AROM: F= 98    ABD= 40  EXT= 10 ER= 30  IR=  neutral  Hip Strength: F + 4  Abd  4  E +4  R knee ROM F 118 E 0  L knee ROM F 115 E 0  B Knee strength  F -4   E -4  Weakness in VMO; weaker left quad set  Weakness in core stability and lumbar and scapular extensors - slightly improved  Patient is ambulating without device  As noted previously, her gait pattern is compromised with initial standing after sitting, Standing tolerance 15 min    Walking tolerance 20 min  Functional:  Some difficulty include steps and getting in and out of car, soft or low seats      STG  Reduce lumbar  Pain during weight bearing by 50% 2-3 weeks - Partial   Increase strength in core stability and scapula  stabilizers   3-4 weeks  LTG  Improve gait pattern and ambulation tolerance  Promote back care management through appropriate exercise  Establish effective HEP  Resume normal functional activities       Plan   2-3 times per week 4 weeks  LE stretching  TE - core stability, LE strengthening  general fitness include HEP              Subjective Patient continues to have episodic sharp gluteal pain    Objective   Lumbar ROM F 55/60 E 12/15 RSB 35/40 LSB 35/40 Hamstring flexibility is good     Both  Hip AROM: F= 98    ABD= 40  EXT= 10 ER= 30  IR=  neutral  Hip Strength: F + 4  Abd  4  E +4  R knee ROM F 118 E 0  L knee ROM F 115 E 0  B Knee strength  F -4   E -4

## 2019-03-11 NOTE — LETTER
2019    Theda Cheadle, MD  [address] [facility] [address] [place] [id number]    Patient: Cristine Latif   YOB: 1950   Date of Visit: 3/11/2019     Encounter Diagnosis     ICD-10-CM    1  Lumbar foraminal stenosis M99 83    2  Degeneration of lumbar intervertebral disc M51 36        Dear Dr Adin Holter:    Please review the attached Plan of Care from [facility] recent visit  Please verify that you agree therapy should continue by signing the attached document and sending it back to our office  If you have any questions or concerns, please don't hesitate to call  Sincerely,    Reji Solitario PT      Referring Provider:      I certify that I have read the below Plan of Care and certify the need for these services furnished under this plan of treatment while under my care  Theda Cheadle, MD  [address]: [phone]          PT Re-Evaluation     Today's date: 3/11/2019  Patient name: Cristine Latif  : 1950  MRN: [id number]  Referring provider: Jaqueline Nguyen MD  Dx:   Encounter Diagnosis     ICD-10-CM    1  Lumbar foraminal stenosis M99 83    2  Degeneration of lumbar intervertebral disc M51 36        Start Time: 1330  Stop Time: 1417  Total time in clinic (min): 47 minutes    Assessment  Assessment details: Cristine Latif 76 y o  female with a recent exacerbation of chronic lower back pain related to lumbar stenosis and degenerative changes in her lumbar spine  The patient did respond well to  an injection to her SI joints, but has recently experienced episodic sharp pain into  Bilateral sciatic notch, R> L,  These symptoms worsen with sitting and initial weight bearing after sitting  Rated 4 to 5 /10   Lumbar ROM F 55/60 E 12/15 RSB 35/40 LSB 35/40 Hamstring flexibility is good     Both  Hip AROM: F= 98    ABD= 40  EXT= 10 ER= 30  IR=  neutral  Hip Strength: F + 4  Abd  4  E +4  R knee ROM F 118 E 0  L knee ROM F 115 E 0  B Knee strength  F -4   E -4  Weakness in VMO; weaker left quad set  Weakness in core stability and lumbar and scapular extensors - slightly improved  Patient is ambulating without device  As noted previously, her gait pattern is compromised with initial standing after sitting, Standing tolerance 15 min    Walking tolerance 20 min  Functional:  Some difficulty include steps and getting in and out of car, soft or low seats      STG  Reduce lumbar  Pain during weight bearing by 50% 2-3 weeks - Partial   Increase strength in core stability and scapula  stabilizers   3-4 weeks  LTG  Improve gait pattern and ambulation tolerance  Promote back care management through appropriate exercise  Establish effective HEP  Resume normal functional activities       Plan   2-3 times per week 4 weeks  LE stretching  TE - core stability, LE strengthening  general fitness include HEP              Subjective Patient continues to have episodic sharp gluteal pain    Objective   Lumbar ROM F 55/60 E 12/15 RSB 35/40 LSB 35/40 Hamstring flexibility is good     Both  Hip AROM: F= 98    ABD= 40  EXT= 10 ER= 30  IR=  neutral  Hip Strength: F + 4  Abd  4  E +4  R knee ROM F 118 E 0  L knee ROM F 115 E 0  B Knee strength  F -4   E -4

## 2019-03-13 ENCOUNTER — OFFICE VISIT (OUTPATIENT)
Dept: PHYSICAL THERAPY | Facility: CLINIC | Age: 69
End: 2019-03-13
Payer: MEDICARE

## 2019-03-13 DIAGNOSIS — M51.36 DEGENERATION OF LUMBAR INTERVERTEBRAL DISC: ICD-10-CM

## 2019-03-13 DIAGNOSIS — M48.061 LUMBAR FORAMINAL STENOSIS: Primary | ICD-10-CM

## 2019-03-13 PROCEDURE — 97140 MANUAL THERAPY 1/> REGIONS: CPT | Performed by: PHYSICAL THERAPIST

## 2019-03-13 PROCEDURE — 97014 ELECTRIC STIMULATION THERAPY: CPT | Performed by: PHYSICAL THERAPIST

## 2019-03-13 PROCEDURE — 97035 APP MDLTY 1+ULTRASOUND EA 15: CPT | Performed by: PHYSICAL THERAPIST

## 2019-03-13 PROCEDURE — 97110 THERAPEUTIC EXERCISES: CPT | Performed by: PHYSICAL THERAPIST

## 2019-03-13 NOTE — PROGRESS NOTES
Daily Note     Today's date: 3/13/2019  Patient name: Demarcus Oliveira  : 1950  MRN: [id number]  Referring provider: Taiwo Carvalho MD  Dx:   Encounter Diagnosis     ICD-10-CM    1  Lumbar foraminal stenosis M99 83    2  Degeneration of lumbar intervertebral disc M51 36        Start Time: 955  Stop Time: 1133  Total time in clinic (min): 98 minutes    Subjective: Patient is scheduled to see Dr Dayan Velarde 3/19/2019  She continues to have lumbar tightness and bilateral gluteal pain, R>L  Objective: The patient did tolerate 10 min on recumbent bike, followed by LE stretching  PT directed basic core stability exercises  Repeated modalities and STM    [facility] 2/27  3/1  3/6  3/13     LE stretching 10  10  10  12     STM 5  5  5  8                         Exercise Diary  2/27  3/1  3/6  3/13     Pelvic tilt 30X  30X  40X  60X     Supine rot 3/10   3/10 8#  3/10 8#  3/10     Supine march 3/10  3/10  3/10  3/10     T-band Abd 3/10  LV4 3/10  LV4 3/10  LV4 3/10                                 Bike 20  22  22  10 L1                                                                                                                 Assessment: Tolerated treatment well  Patient had less pain post treatment      Plan: Continue per plan of care

## 2019-09-24 ENCOUNTER — EVALUATION (OUTPATIENT)
Dept: PHYSICAL THERAPY | Facility: CLINIC | Age: 69
End: 2019-09-24
Payer: MEDICARE

## 2019-09-24 ENCOUNTER — TRANSCRIBE ORDERS (OUTPATIENT)
Dept: PHYSICAL THERAPY | Facility: CLINIC | Age: 69
End: 2019-09-24

## 2019-09-24 DIAGNOSIS — M25.512 ACUTE PAIN OF BOTH SHOULDERS: Primary | ICD-10-CM

## 2019-09-24 DIAGNOSIS — M25.511 ACUTE PAIN OF BOTH SHOULDERS: Primary | ICD-10-CM

## 2019-09-24 DIAGNOSIS — M62.838 SPASM OF CERVICAL PARASPINOUS MUSCLE: ICD-10-CM

## 2019-09-24 PROCEDURE — 97140 MANUAL THERAPY 1/> REGIONS: CPT | Performed by: PHYSICAL THERAPIST

## 2019-09-24 PROCEDURE — 97161 PT EVAL LOW COMPLEX 20 MIN: CPT | Performed by: PHYSICAL THERAPIST

## 2019-09-24 PROCEDURE — 97110 THERAPEUTIC EXERCISES: CPT | Performed by: PHYSICAL THERAPIST

## 2019-09-24 PROCEDURE — 97035 APP MDLTY 1+ULTRASOUND EA 15: CPT | Performed by: PHYSICAL THERAPIST

## 2019-09-24 PROCEDURE — 97010 HOT OR COLD PACKS THERAPY: CPT | Performed by: PHYSICAL THERAPIST

## 2019-09-24 NOTE — LETTER
2019    Maximo Jones MD  [address] [place] [address]    Patient: Lindsay Romero   YOB: 1950   Date of Visit: 2019     Encounter Diagnosis     ICD-10-CM    1  Acute pain of both shoulders M25 511     M25 512    2  Spasm of cervical paraspinous muscle M62 838        Dear Dr Cathy Blanco:    Thank you for your recent referral of Lindsay Romero  Please review the attached evaluation summary from Renay's recent visit  Please verify that you agree with the plan of care by signing the attached order  If you have any questions or concerns, please do not hesitate to call  I sincerely appreciate the opportunity to share in the care of one of your patients and hope to have another opportunity to work with you in the near future  Sincerely,    Stefano Donnelly, PT      Referring Provider:      I certify that I have read the below Plan of Care and certify the need for these services furnished under this plan of treatment while under my care  Maximo Jones MD  [address] [facility] [address]: [phone]          PT Evaluation     Today's date: 2019  Patient name: Lindsay Romero  : 1950  MRN: [id number]  Referring provider: Massiel Rinaldi MD  Dx:   Encounter Diagnosis     ICD-10-CM    1  Acute pain of both shoulders M25 511     M25 512    2  Spasm of cervical paraspinous muscle M62 838        Start Time: 1300  Stop Time: 1354  Total time in clinic (min): 54 minutes    Assessment  Assessment details:       Lindsay Romero 71 y o  female presents an acute onset on scapular muscle spasms involving bilateral upper trapezius and rhomboid muscles  The patient was performing some shoulder exercises which included abducted shoulder rolls and dumbbell biceps curls  She experienced the scapular spasms post exercises  The patient has a history of right shoulder arthoplasty and left shoulder osteoarthritis      Both shoulder joints are stable  The right shoulder replacement is intact and stable  Left shoulder joint intact with some restricted motion due to arthritis  Rotator cuff intact bilaterally, but some irritation of supraspinatus from the repetitive exercise  Palpable spasm in upper trapezium and rhomboid muscles causing some cervical pain at the occipital insertions  Palpable tenderness in this region  Bilateral scapular pain rated 4/10 at worse with palpation, and when steering her car    Some referred pain to anterolateral shoulder, but no radicular symptoms      AROM R shoulder within previous good range following joint replacement  Left shoulder also within previous range  Impression: scapular spasms         STG:  Reduce scapula and short pain shoulder pain by 50% 1-22 weeks  Promote posture awareness through patient education - day one and ongoing  Resolve spasms 2 weeks  Pain free shoulder motion 2 weeks        LTG:  Resume pain free functional activity, including driving          Subjective Patient presents acute scapular spasms    Objective   alpable spasm in upper trapezium and rhomboid muscles causing some cervical pain at the occipital insertions  Palpable tenderness in this region  Bilateral scapular pain rated 4/10 at worse with palpation, and when steering her car  Rosemary King

## 2019-09-24 NOTE — PROGRESS NOTES
PT Evaluation     Today's date: 2019  Patient name: Nyla Ruvalcaba  : 1950  MRN: [id number]  Referring provider: Ronak Malone MD  Dx:   Encounter Diagnosis     ICD-10-CM    1  Acute pain of both shoulders M25 511     M25 512    2  Spasm of cervical paraspinous muscle M62 838        Start Time: 1300  Stop Time: 1354  Total time in clinic (min): 54 minutes    Assessment  Assessment details:       Nyla Ruvalcaba 71 y o  female presents an acute onset on scapular muscle spasms involving bilateral upper trapezius and rhomboid muscles  The patient was performing some shoulder exercises which included abducted shoulder rolls and dumbbell biceps curls  She experienced the scapular spasms post exercises  The patient has a history of right shoulder arthoplasty and left shoulder osteoarthritis  Both shoulder joints are stable  The right shoulder replacement is intact and stable  Left shoulder joint intact with some restricted motion due to arthritis  Rotator cuff intact bilaterally, but some irritation of supraspinatus from the repetitive exercise  Palpable spasm in upper trapezium and rhomboid muscles causing some cervical pain at the occipital insertions  Palpable tenderness in this region  Bilateral scapular pain rated 4/10 at worse with palpation, and when steering her car    Some referred pain to anterolateral shoulder, but no radicular symptoms      AROM R shoulder within previous good range following joint replacement  Left shoulder also within previous range     Impression: scapular spasms         STG:  Reduce scapula and short pain shoulder pain by 50% 1-22 weeks  Promote posture awareness through patient education - day one and ongoing  Resolve spasms 2 weeks  Pain free shoulder motion 2 weeks        LTG:  Resume pain free functional activity, including driving          Subjective Patient presents acute scapular spasms    Objective   alpable spasm in upper trapezium and rhomboid muscles causing some cervical pain at the occipital insertions  Palpable tenderness in this region  Bilateral scapular pain rated 4/10 at worse with palpation, and when steering her car  Jerry Fatoumata

## 2019-09-25 ENCOUNTER — OFFICE VISIT (OUTPATIENT)
Dept: PHYSICAL THERAPY | Facility: CLINIC | Age: 69
End: 2019-09-25
Payer: MEDICARE

## 2019-09-25 DIAGNOSIS — M25.512 ACUTE PAIN OF BOTH SHOULDERS: Primary | ICD-10-CM

## 2019-09-25 DIAGNOSIS — M62.838 SPASM OF CERVICAL PARASPINOUS MUSCLE: ICD-10-CM

## 2019-09-25 DIAGNOSIS — M25.511 ACUTE PAIN OF BOTH SHOULDERS: Primary | ICD-10-CM

## 2019-09-25 PROCEDURE — 97035 APP MDLTY 1+ULTRASOUND EA 15: CPT | Performed by: PHYSICAL THERAPIST

## 2019-09-25 PROCEDURE — 97010 HOT OR COLD PACKS THERAPY: CPT | Performed by: PHYSICAL THERAPIST

## 2019-09-25 PROCEDURE — 97014 ELECTRIC STIMULATION THERAPY: CPT | Performed by: PHYSICAL THERAPIST

## 2019-09-25 PROCEDURE — 97140 MANUAL THERAPY 1/> REGIONS: CPT | Performed by: PHYSICAL THERAPIST

## 2019-09-25 NOTE — PROGRESS NOTES
Daily Note     Today's date: 2019  Patient name: Lindsay Romero  : 1950  MRN: [id number]  Referring provider: Massiel Rinaldi MD  Dx:   Encounter Diagnosis     ICD-10-CM    1  Acute pain of both shoulders M25 511     M25 512    2  Spasm of cervical paraspinous muscle M62 838        Start Time: 1540  Stop Time: 1635  Total time in clinic (min): 55 minutes    Subjective: Patient was able to drive to [facility] for her allergy shot  She favored her right shoulder, and drive more with her left hand  Objective: PT provided ultrasound to anterior shoulder and upper middle trapezius, bilaterally  IFC HP and STM  Held on exercises (pulleys)      Assessment: Tolerated treatment well  Patient noted relief post treatment      Plan: Continue per plan of care

## 2019-09-26 ENCOUNTER — OFFICE VISIT (OUTPATIENT)
Dept: PHYSICAL THERAPY | Facility: CLINIC | Age: 69
End: 2019-09-26
Payer: MEDICARE

## 2019-09-26 DIAGNOSIS — M25.511 ACUTE PAIN OF BOTH SHOULDERS: Primary | ICD-10-CM

## 2019-09-26 DIAGNOSIS — M62.838 SPASM OF CERVICAL PARASPINOUS MUSCLE: ICD-10-CM

## 2019-09-26 DIAGNOSIS — M25.512 ACUTE PAIN OF BOTH SHOULDERS: Primary | ICD-10-CM

## 2019-09-26 PROCEDURE — 97014 ELECTRIC STIMULATION THERAPY: CPT | Performed by: PHYSICAL THERAPIST

## 2019-09-26 PROCEDURE — 97110 THERAPEUTIC EXERCISES: CPT | Performed by: PHYSICAL THERAPIST

## 2019-09-26 PROCEDURE — 97140 MANUAL THERAPY 1/> REGIONS: CPT | Performed by: PHYSICAL THERAPIST

## 2019-09-26 PROCEDURE — 97010 HOT OR COLD PACKS THERAPY: CPT | Performed by: PHYSICAL THERAPIST

## 2019-09-26 PROCEDURE — 97035 APP MDLTY 1+ULTRASOUND EA 15: CPT | Performed by: PHYSICAL THERAPIST

## 2019-09-26 NOTE — PROGRESS NOTES
Daily Note     Today's date: 2019  Patient name: Zaire Connell  : 1950  MRN: [id number]  Referring provider: Evelyn Valladares MD  Dx:   Encounter Diagnosis     ICD-10-CM    1  Acute pain of both shoulders M25 511     M25 512    2  Spasm of cervical paraspinous muscle M62 838        Start Time: 1000  Stop Time: 1055  Total time in clinic (min): 55 minutes    Subjective: Patient reports less spasms, but still has some issues with driving when steering with her right arm    Objective: PT repeated modalities and STM  Patient performed pulleys for shoulder ROM  Assessment: Tolerated treatment well  Patient did not report any scapular pain post treatment      Plan: Continue per plan of care

## 2019-09-27 ENCOUNTER — APPOINTMENT (OUTPATIENT)
Dept: PHYSICAL THERAPY | Facility: CLINIC | Age: 69
End: 2019-09-27
Payer: MEDICARE

## 2019-10-09 NOTE — PROGRESS NOTES
Daily Note     Today's date: 2018  Patient name: Carole Dolan  : 1950  MRN: [id number]  Referring provider: Carissa Gaxiola MD  Dx:   Encounter Diagnosis     ICD-10-CM    1  Lumbar foraminal stenosis M99 83    2  Degeneration of lumbar intervertebral disc M51 36    3  Right hip pain M25 551        Start Time: 1100  Stop Time: 1153  Total time in clinic (min): 53 minutes    Subjective: Patient expressed concern about her right knee pain, and is considering contacting Bhupinder Valdez for a consultation  She has also experienced a bad reaction to the Gabapentin medication, and will be discontinuing it's use upon the recommendations of several physicians  Objective: PTA performed LE stretching and basic core stability exercises which were tolerated well  Manual  7/27  8/2  8/3  8/6  8/8  8/14 8/22  8/23  8/27      STM 12  --->  -->    10   10  10  10       Stretch      12  12  12  12  12  12  12           Exercise Diary  8/29  8/2  8/3 8/6  8/8  8/22  8/23  8/27        [employer] home 1/10  5"   1/10  5" 1/10  5"  1/10  5"  /10  5"  10  5"  home        LF back extension xxxx  25#  2/15  25#  2/15  --->  xxx  xxx  xx  xxx                                                       Core Stability                       Pelvic tilt  40  20  30 40  40  40  40  40       T-band abd  L4 3/10  L3  2/10  L4  2/10  L4  3/10  L4  3/10  L4  3/10  L4  3/10  L4  3/10       Supine rotation  #4 /  1 5#    2#    2#    2#    2#    2#                                  Bike   12m LV2        5min LV1 10m  L1  10m  L1  12m  L1                                                                                      Assessment: Tolerated treatment well  Patient still experienced some difficulty in ambulation, but is not sure if this is caused by her back or right knee      Plan: Continue per plan of care  Re-assess next visit  Left foot ulcer excisional specimen